# Patient Record
Sex: MALE | Race: BLACK OR AFRICAN AMERICAN | NOT HISPANIC OR LATINO | Employment: UNEMPLOYED | URBAN - METROPOLITAN AREA
[De-identification: names, ages, dates, MRNs, and addresses within clinical notes are randomized per-mention and may not be internally consistent; named-entity substitution may affect disease eponyms.]

---

## 2017-01-02 ENCOUNTER — HOSPITAL ENCOUNTER (EMERGENCY)
Facility: HOSPITAL | Age: 42
Discharge: HOME | End: 2017-01-02
Attending: EMERGENCY MEDICINE
Payer: MEDICAID

## 2017-01-02 VITALS
SYSTOLIC BLOOD PRESSURE: 172 MMHG | HEART RATE: 104 BPM | RESPIRATION RATE: 18 BRPM | HEIGHT: 68 IN | WEIGHT: 160 LBS | DIASTOLIC BLOOD PRESSURE: 99 MMHG | OXYGEN SATURATION: 93 % | BODY MASS INDEX: 24.25 KG/M2

## 2017-01-02 DIAGNOSIS — R06.03 RESPIRATORY DISTRESS: Primary | ICD-10-CM

## 2017-01-02 DIAGNOSIS — J45.901 ACUTE SEVERE EXACERBATION OF ASTHMA: ICD-10-CM

## 2017-01-02 LAB
ALBUMIN SERPL BCP-MCNC: 4 G/DL
ALP SERPL-CCNC: 67 U/L
ALT SERPL W/O P-5'-P-CCNC: 21 U/L
ANION GAP SERPL CALC-SCNC: 11 MMOL/L
AST SERPL-CCNC: 20 U/L
BASOPHILS # BLD AUTO: 0.08 K/UL
BASOPHILS NFR BLD: 0.5 %
BILIRUB SERPL-MCNC: 0.7 MG/DL
BNP SERPL-MCNC: 26 PG/ML
BUN SERPL-MCNC: 8 MG/DL
CALCIUM SERPL-MCNC: 9.5 MG/DL
CHLORIDE SERPL-SCNC: 104 MMOL/L
CO2 SERPL-SCNC: 24 MMOL/L
CREAT SERPL-MCNC: 1 MG/DL
DIFFERENTIAL METHOD: ABNORMAL
EOSINOPHIL # BLD AUTO: 2.3 K/UL
EOSINOPHIL NFR BLD: 15 %
ERYTHROCYTE [DISTWIDTH] IN BLOOD BY AUTOMATED COUNT: 15.2 %
EST. GFR  (AFRICAN AMERICAN): >60 ML/MIN/1.73 M^2
EST. GFR  (NON AFRICAN AMERICAN): >60 ML/MIN/1.73 M^2
GLUCOSE SERPL-MCNC: 116 MG/DL
HCT VFR BLD AUTO: 40.5 %
HGB BLD-MCNC: 13.2 G/DL
LYMPHOCYTES # BLD AUTO: 1 K/UL
LYMPHOCYTES NFR BLD: 6.3 %
MCH RBC QN AUTO: 24.4 PG
MCHC RBC AUTO-ENTMCNC: 32.6 %
MCV RBC AUTO: 75 FL
MONOCYTES # BLD AUTO: 0.2 K/UL
MONOCYTES NFR BLD: 1.6 %
NEUTROPHILS # BLD AUTO: 11.6 K/UL
NEUTROPHILS NFR BLD: 76.9 %
PLATELET # BLD AUTO: 344 K/UL
PMV BLD AUTO: 11.5 FL
POTASSIUM SERPL-SCNC: 4.1 MMOL/L
PROT SERPL-MCNC: 7.3 G/DL
RBC # BLD AUTO: 5.42 M/UL
SODIUM SERPL-SCNC: 139 MMOL/L
WBC # BLD AUTO: 15.17 K/UL

## 2017-01-02 PROCEDURE — 63600175 PHARM REV CODE 636 W HCPCS: Performed by: EMERGENCY MEDICINE

## 2017-01-02 PROCEDURE — 96374 THER/PROPH/DIAG INJ IV PUSH: CPT

## 2017-01-02 PROCEDURE — 80053 COMPREHEN METABOLIC PANEL: CPT

## 2017-01-02 PROCEDURE — 25000242 PHARM REV CODE 250 ALT 637 W/ HCPCS: Performed by: EMERGENCY MEDICINE

## 2017-01-02 PROCEDURE — 94640 AIRWAY INHALATION TREATMENT: CPT

## 2017-01-02 PROCEDURE — 83880 ASSAY OF NATRIURETIC PEPTIDE: CPT

## 2017-01-02 PROCEDURE — 99285 EMERGENCY DEPT VISIT HI MDM: CPT | Mod: 25

## 2017-01-02 PROCEDURE — 85025 COMPLETE CBC W/AUTO DIFF WBC: CPT

## 2017-01-02 RX ORDER — METHYLPREDNISOLONE SODIUM SUCCINATE 125 MG/2ML
125 INJECTION INTRAMUSCULAR; INTRAVENOUS
Status: COMPLETED | OUTPATIENT
Start: 2017-01-02 | End: 2017-01-02

## 2017-01-02 RX ORDER — IPRATROPIUM BROMIDE AND ALBUTEROL SULFATE 2.5; .5 MG/3ML; MG/3ML
3 SOLUTION RESPIRATORY (INHALATION)
Status: COMPLETED | OUTPATIENT
Start: 2017-01-02 | End: 2017-01-02

## 2017-01-02 RX ORDER — PREDNISONE 20 MG/1
60 TABLET ORAL DAILY
Qty: 15 TABLET | Refills: 0 | Status: SHIPPED | OUTPATIENT
Start: 2017-01-02 | End: 2017-01-07

## 2017-01-02 RX ORDER — ALBUTEROL SULFATE 90 UG/1
1-2 AEROSOL, METERED RESPIRATORY (INHALATION) EVERY 6 HOURS PRN
Qty: 1 INHALER | Refills: 1 | Status: SHIPPED | OUTPATIENT
Start: 2017-01-02 | End: 2017-02-06

## 2017-01-02 RX ORDER — ALBUTEROL SULFATE 2.5 MG/.5ML
5 SOLUTION RESPIRATORY (INHALATION)
Status: COMPLETED | OUTPATIENT
Start: 2017-01-02 | End: 2017-01-02

## 2017-01-02 RX ORDER — FLUTICASONE PROPIONATE AND SALMETEROL XINAFOATE 230; 21 UG/1; UG/1
2 AEROSOL, METERED RESPIRATORY (INHALATION) 2 TIMES DAILY
Qty: 12 G | Refills: 0 | Status: SHIPPED | OUTPATIENT
Start: 2017-01-02 | End: 2017-03-31

## 2017-01-02 RX ADMIN — IPRATROPIUM BROMIDE AND ALBUTEROL SULFATE 3 ML: .5; 3 SOLUTION RESPIRATORY (INHALATION) at 02:01

## 2017-01-02 RX ADMIN — ALBUTEROL SULFATE 5 MG: 2.5 SOLUTION RESPIRATORY (INHALATION) at 02:01

## 2017-01-02 RX ADMIN — METHYLPREDNISOLONE SODIUM SUCCINATE 125 MG: 125 INJECTION, POWDER, FOR SOLUTION INTRAMUSCULAR; INTRAVENOUS at 02:01

## 2017-01-02 NOTE — ED AVS SNAPSHOT
[facility]  [place] [address]               Harry Barrientos Jr.   2017  1:23 PM   ED    Description:  Male : 1975   Department:  [facility]           Your Care was Coordinated By:     Provider Role From To    René Jett MD Attending Provider 17 1331 --      Reason for Visit     Shortness of Breath           Diagnoses this Visit        Comments    Respiratory distress    -  Primary     Acute severe exacerbation of asthma           ED Disposition     None           To Do List           Follow-up Information     Follow up with Silvestre Cole MD In 3 days.    Specialty:  Family Medicine    Contact information:    [address]  [facility] [address]  [phone]         These Medications        Disp Refills Start End    fluticasone-salmeterol 230-21 mcg/dose (ADVAIR HFA) 230-21 mcg/actuation HFAA inhaler 12 g 0 2017     Inhale 2 puffs into the lungs 2 (two) times daily. - Inhalation    [facility] [id number] [place], [place] [address]NIT[place] VICENTE [facility] [facility] Ph #: [phone]       albuterol 90 mcg/actuation inhaler 1 Inhaler 1 2017    Inhale 1-2 puffs into the lungs every 6 (six) hours as needed for Wheezing. - Inhalation    [facility] [address] [facility] [place] [facility] [address] [facility]  [place] Ph #: [phone]       predniSONE (DELTASONE) 20 MG tablet 15 tablet 0 2017    Take 3 tablets (60 mg total) by mouth once daily. - Oral    [facility] [address] [facility] [address] [facility]  [place] Ph #: [phone]         [facility]     [facility] Care [facility] -  Assistance  Registered nurses in the [facility] provide clinical advisement, health education, appointment booking, and other advisory  services.  Call for this free service at [phone].             Medications           Message regarding Medications     Verify the changes and/or additions to your medication regime listed below are the same as discussed with your clinician today.  If any of these changes or additions are incorrect, please notify your healthcare provider.        START taking these NEW medications        Refills    fluticasone-salmeterol 230-21 mcg/dose (ADVAIR HFA) 230-21 mcg/actuation HFAA inhaler 0    Sig: Inhale 2 puffs into the lungs 2 (two) times daily.    Class: Print    Route: Inhalation    albuterol 90 mcg/actuation inhaler 1    Sig: Inhale 1-2 puffs into the lungs every 6 (six) hours as needed for Wheezing.    Class: Print    Route: Inhalation    predniSONE (DELTASONE) 20 MG tablet 0    Sig: Take 3 tablets (60 mg total) by mouth once daily.    Class: Print    Route: Oral      These medications were administered today        Dose Freq    methylPREDNISolone sodium succinate injection 125 mg 125 mg ED 1 Time    Sig: Inject 125 mg into the vein ED 1 Time.    Class: Normal    Route: Intravenous    albuterol-ipratropium 2.5mg-0.5mg/3mL nebulizer solution 3 mL 3 mL ED 1 Time    Sig: Take 3 mLs by nebulization ED 1 Time.    Class: Normal    Route: Nebulization    albuterol sulfate nebulizer solution 5 mg 5 mg ED 1 Time    Sig: Take 5 mg by nebulization ED 1 Time.    Class: Normal    Route: Nebulization      STOP taking these medications     fluticasone-salmeterol 250-50 mcg/dose (ADVAIR) 250-50 mcg/dose diskus inhaler Inhale 1 puff into the lungs 2 (two) times daily.           Verify that the below list of medications is an accurate representation of the medications you are currently taking.  If none reported, the list may be blank. If incorrect, please contact your healthcare provider. Carry this list with you in case of emergency.           Current Medications     albuterol 90 mcg/actuation inhaler Inhale 1-2 puffs into the  "lungs every 6 (six) hours as needed for Wheezing.    albuterol sulfate nebulizer solution 5 mg Take 5 mg by nebulization ED 1 Time.    albuterol-ipratropium 2.5mg-0.5mg/3mL (DUO-NEB) 0.5 mg-3 mg(2.5 mg base)/3 mL nebulizer solution Take 3 mLs by nebulization every 6 (six) hours while awake.    albuterol-ipratropium 2.5mg-0.5mg/3mL nebulizer solution 3 mL Take 3 mLs by nebulization ED 1 Time.    dicyclomine (BENTYL) 20 mg tablet Take 1 tablet (20 mg total) by mouth every 6 (six) hours as needed (every 6 hours as needed for pain).    fluticasone-salmeterol 230-21 mcg/dose (ADVAIR HFA) 230-21 mcg/actuation HFAA inhaler Inhale 2 puffs into the lungs 2 (two) times daily.    predniSONE (DELTASONE) 20 MG tablet Take 3 tablets (60 mg total) by mouth once daily.           Clinical Reference Information           Your Vitals Were     BP Pulse Resp Height Weight SpO2    150/107 108 18 5' 8" (1.727 m) 72.6 kg (160 lb) 94%    BMI                24.33 kg/m2          Allergies as of 1/2/2017     No Known Allergies      Immunizations Administered on Date of Encounter - 1/2/2017     None      ED Micro, Lab, POCT     Start Ordered       Status Ordering Provider    01/02/17 1347 01/02/17 1346  Comprehensive metabolic panel  STAT      Final result     01/02/17 1347 01/02/17 1346  CBC auto differential  STAT      Final result     01/02/17 1347 01/02/17 1346  Brain natriuretic peptide  STAT      Final result       ED Imaging Orders     Start Ordered       Status Ordering Provider    01/02/17 1346 01/02/17 1346  X-Ray Chest AP Portable  1 time imaging      Final result         Discharge Instructions       Please return immediately if you get worse or if new problems develop.  Please make an appointment to see your primary care doctor this week.  Rest.     [facility] complies with applicable Federal civil rights laws and does not discriminate on the basis of race, color, national origin, age, disability, or sex.      "   Language Assistance Services     ATTENTION: Language assistance services are available, free of charge. Please call [phone].      ATENCIÓN: Si habla gerardañol, tiene a medina disposición servicios gratuitos de asistencia lingüística. Llame al [phone].     CHÚ Ý: N?u b?n nói Ti?ng Vi?t, có các d?ch v? h? tr? ngôn ng? mi?n phí dành cho b?n. G?i s? [phone].

## 2017-01-02 NOTE — ED PROVIDER NOTES
Encounter Date: 1/2/2017    SCRIBE #1 NOTE: I, Laurie Hui , am scribing for, and in the presence of,  René Jett MD. I have scribed the following portions of the note - Other sections scribed: HPI and ROS .       History     Chief Complaint   Patient presents with    Shortness of Breath     pt states history of asthma and SOB today, pt given breathing treatment by EMS and states minimal relief.      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 Pt states he used a total of 60 albuterol pumps yesterday. Pt had his first breathing treatment 1 hour PTA and his second via EMS en route; pt reports minimal relief. Pt denies hx smoking. Pt denies fever, chills, headache, abdominal pain or other symptoms. No medical problems. No allergies. PSHx includes appendectomy and back surgery.     The history is provided by the patient. No  was used.     Past Medical History   Diagnosis Date    Asthma     Herniated lumbar intervertebral disc      No past medical history pertinent negatives.  Past Surgical History   Procedure Laterality Date    Appendectomy      Back surgery       Family History   Problem Relation Age of Onset    Diabetes Mother     Heart disease Father      CABG     Social History   Substance Use Topics    Smoking status: Never Smoker    Smokeless tobacco: None    Alcohol use No      Comment: 2-3 beers/day; occasionally more on weekends     Review of Systems   Constitutional: Negative for chills and fever.   HENT: Negative for ear pain and sore throat.    Eyes: Negative for pain.   Respiratory: Positive for cough (green sputum ) and shortness of breath.    Cardiovascular: Negative for chest pain.   Gastrointestinal: Negative for abdominal pain,  diarrhea, nausea and vomiting.   Genitourinary: Negative for dysuria.   Musculoskeletal: Negative for myalgias (arm or leg pain).   Skin: Negative for rash.   Neurological: Negative for headaches.       Physical Exam   Initial Vitals   BP Pulse Resp Temp SpO2   01/02/17 1320 01/02/17 1320 01/02/17 1320 -- 01/02/17 1328   178/87 117 22  92 %     Physical Exam  The patient was examined specifically for the following:   General:No significant distress, Good color, Warm and dry. Head and neck:Scalp atraumatic, Neck supple. Neurological:Appropriate conversation, Gross motor deficits. Eyes:Conjugate gaze, Clear corneas. ENT: No epistaxis. Cardiac: Regular rate and rhythm, Grossly normal heart tones. Pulmonary: Wheezing, Rales. Gastrointestinal: Abdominal tenderness, Abdominal distention. Musculoskeletal: Extremity deformity, Apparent pain with range of motion of the joints. Skin: Rash.   The findings on examination were normal except for the following: The patient is in moderate respiratory distress.  He has wheezing bilaterally.  Oxygen saturations are 92%.  The abdomen is nontender.  The heart is remarkable for regular tachycardia.  ED Course   Critical Care  Date/Time: 1/2/2017 4:07 PM  Performed by: MARV NIETO.  Authorized by: MARV NIETO   Direct patient critical care time: 22 minutes  Additional history critical care time: 11 minutes  Ordering / reviewing critical care time: 7 minutes  Documentation critical care time: 15 minutes  Total critical care time (exclusive of procedural time) : 55 minutes  Critical care time was exclusive of separately billable procedures and treating other patients and teaching time.  Critical care was necessary to treat or prevent imminent or life-threatening deterioration of the following conditions: respiratory failure.  Critical care was time spent personally by me on the following activities: development of treatment plan with patient or surrogate, examination of patient,  ordering and review of laboratory studies, re-evaluation of patient's condition, pulse oximetry, ordering and performing treatments and interventions, evaluation of patient's response to treatment, discussions with primary provider, review of old charts, ordering and review of radiographic studies and obtaining history from patient or surrogate.        Labs Reviewed   COMPREHENSIVE METABOLIC PANEL - Abnormal; Notable for the following:        Result Value    Glucose 116 (*)     All other components within normal limits   CBC W/ AUTO DIFFERENTIAL - Abnormal; Notable for the following:     WBC 15.17 (*)     Hemoglobin 13.2 (*)     MCV 75 (*)     MCH 24.4 (*)     RDW 15.2 (*)     Gran # 11.6 (*)     Mono # 0.2 (*)     Eos # 2.3 (*)     Gran% 76.9 (*)     Lymph% 6.3 (*)     Mono% 1.6 (*)     Eosinophil% 15.0 (*)     All other components within normal limits   B-TYPE NATRIURETIC PEPTIDE     EKG Readings: (Independently Interpreted)   The patient's EKG reveals a normal sinus rhythm with a marked sinus arrhythmia.  There are no significant ST segment or T-wave changes.  There is no evidence of acute myocardial infarction or malignant arrhythmia.        X-Rays:   Independently Interpreted Readings:   Other Readings:  Chest x-ray fails to reveal pneumothorax pneumonia pleural effusion        Medical decision making: Given the above, this patient presents to emergency room with shortness of breath.  The patient was in severe respiratory distress he arrived by ambulance.  He had wheezing throughout.  He was treated with advise her treatments and IV steroids at 410 the patient is comfortable.  I will discharge him to outpatient evaluation and treatment.  I will start him back on steroids.  I will continue his albuterol.  I will have him return if he gets worse or if new problems develop.  I doubt pulmonary embolus pneumothorax pneumonia pleural effusion the chest x-ray is negative.  I do not think this is a cardiac syndrome            Scribe Attestation:   Scribe #1: I performed the above scribed service and the documentation accurately describes the services I performed. I attest to the accuracy of the note.    Attending Attestation:           Physician Attestation for Scribe:  Physician Attestation Statement for Scribe #1: I, René Jett MD, reviewed documentation, as scribed by Laurie Hui  in my presence, and it is both accurate and complete.                 ED Course     Clinical Impression:   The primary encounter diagnosis was Respiratory distress. A diagnosis of Acute severe exacerbation of asthma was also pertinent to this visit.          René Jett MD  01/03/17 0849

## 2017-01-02 NOTE — ED NOTES
Hourly rounding performed.  Pt resting in bed, VS stable, cardiac, pulse ox and BP monitoring in place. Side rails up, call light within reach, pt resting.

## 2017-01-02 NOTE — DISCHARGE INSTRUCTIONS
Please return immediately if you get worse or if new problems develop.  Please make an appointment to see your primary care doctor this week.  Rest.

## 2017-01-02 NOTE — ED TRIAGE NOTES
Pt here w SOB since last night. History of asthma. States that usually breathing treatment helps but today it hasn't. Productive cough with yellow tinged sputum.

## 2017-01-02 NOTE — ED NOTES
Hourly rounding performed.  Pt resting in bed, VS stable, cardiac, pulse ox and BP monitoring in place. Pt reports that he is feeling much better and is breathing fine.  Pt sounds lightly congested when he speaks but no longer appears short of breath.

## 2017-02-06 ENCOUNTER — HOSPITAL ENCOUNTER (EMERGENCY)
Facility: HOSPITAL | Age: 42
Discharge: HOME | End: 2017-02-07
Attending: EMERGENCY MEDICINE
Payer: MEDICAID

## 2017-02-06 DIAGNOSIS — J45.51 SEVERE PERSISTENT ASTHMA WITH EXACERBATION: Primary | ICD-10-CM

## 2017-02-06 LAB
ALBUMIN SERPL BCP-MCNC: 4 G/DL
ALP SERPL-CCNC: 63 U/L
ALT SERPL W/O P-5'-P-CCNC: 16 U/L
ANION GAP SERPL CALC-SCNC: 10 MMOL/L
AST SERPL-CCNC: 17 U/L
BASOPHILS # BLD AUTO: 0.01 K/UL
BASOPHILS NFR BLD: 0.1 %
BILIRUB SERPL-MCNC: 0.3 MG/DL
BUN SERPL-MCNC: 12 MG/DL
CALCIUM SERPL-MCNC: 9.2 MG/DL
CHLORIDE SERPL-SCNC: 103 MMOL/L
CO2 SERPL-SCNC: 25 MMOL/L
CREAT SERPL-MCNC: 1.1 MG/DL
DIFFERENTIAL METHOD: ABNORMAL
EOSINOPHIL # BLD AUTO: 0 K/UL
EOSINOPHIL NFR BLD: 0 %
ERYTHROCYTE [DISTWIDTH] IN BLOOD BY AUTOMATED COUNT: 15.6 %
EST. GFR  (AFRICAN AMERICAN): >60 ML/MIN/1.73 M^2
EST. GFR  (NON AFRICAN AMERICAN): >60 ML/MIN/1.73 M^2
GLUCOSE SERPL-MCNC: 105 MG/DL
HCT VFR BLD AUTO: 40.5 %
HGB BLD-MCNC: 13.2 G/DL
INR PPP: 1
LYMPHOCYTES # BLD AUTO: 0.4 K/UL
LYMPHOCYTES NFR BLD: 2.7 %
MAGNESIUM SERPL-MCNC: 2.3 MG/DL
MCH RBC QN AUTO: 24.2 PG
MCHC RBC AUTO-ENTMCNC: 32.6 %
MCV RBC AUTO: 74 FL
MONOCYTES # BLD AUTO: 1.5 K/UL
MONOCYTES NFR BLD: 10.3 %
NEUTROPHILS # BLD AUTO: 12.4 K/UL
NEUTROPHILS NFR BLD: 86.9 %
PLATELET # BLD AUTO: 298 K/UL
PMV BLD AUTO: 11.2 FL
POTASSIUM SERPL-SCNC: 4 MMOL/L
PROT SERPL-MCNC: 7.6 G/DL
PROTHROMBIN TIME: 10.7 SEC
RBC # BLD AUTO: 5.45 M/UL
SODIUM SERPL-SCNC: 138 MMOL/L
WBC # BLD AUTO: 14.22 K/UL

## 2017-02-06 PROCEDURE — 99284 EMERGENCY DEPT VISIT MOD MDM: CPT | Mod: 25

## 2017-02-06 PROCEDURE — 85025 COMPLETE CBC W/AUTO DIFF WBC: CPT

## 2017-02-06 PROCEDURE — 83735 ASSAY OF MAGNESIUM: CPT

## 2017-02-06 PROCEDURE — 80053 COMPREHEN METABOLIC PANEL: CPT

## 2017-02-06 PROCEDURE — 96374 THER/PROPH/DIAG INJ IV PUSH: CPT

## 2017-02-06 PROCEDURE — 84484 ASSAY OF TROPONIN QUANT: CPT

## 2017-02-06 PROCEDURE — 25000242 PHARM REV CODE 250 ALT 637 W/ HCPCS: Performed by: EMERGENCY MEDICINE

## 2017-02-06 PROCEDURE — 27100107 HC POCKET PEAK FLOW METER

## 2017-02-06 PROCEDURE — 99900035 HC TECH TIME PER 15 MIN (STAT)

## 2017-02-06 PROCEDURE — 63600175 PHARM REV CODE 636 W HCPCS: Performed by: EMERGENCY MEDICINE

## 2017-02-06 PROCEDURE — 83880 ASSAY OF NATRIURETIC PEPTIDE: CPT

## 2017-02-06 PROCEDURE — 94644 CONT INHLJ TX 1ST HOUR: CPT

## 2017-02-06 PROCEDURE — 85610 PROTHROMBIN TIME: CPT

## 2017-02-06 RX ORDER — PREDNISONE 5 MG/1
5 TABLET ORAL DAILY
COMMUNITY
End: 2017-03-31

## 2017-02-06 RX ORDER — METHYLPREDNISOLONE SODIUM SUCCINATE 125 MG/2ML
125 INJECTION INTRAMUSCULAR; INTRAVENOUS
Status: COMPLETED | OUTPATIENT
Start: 2017-02-06 | End: 2017-02-06

## 2017-02-06 RX ORDER — IPRATROPIUM BROMIDE 0.5 MG/2.5ML
0.5 SOLUTION RESPIRATORY (INHALATION) CONTINUOUS
Status: DISCONTINUED | OUTPATIENT
Start: 2017-02-06 | End: 2017-02-07 | Stop reason: HOSPADM

## 2017-02-06 RX ORDER — ALBUTEROL SULFATE 2.5 MG/.5ML
10 SOLUTION RESPIRATORY (INHALATION) CONTINUOUS
Status: DISCONTINUED | OUTPATIENT
Start: 2017-02-06 | End: 2017-02-07 | Stop reason: HOSPADM

## 2017-02-06 RX ADMIN — IPRATROPIUM BROMIDE 0.5 MG: 0.5 SOLUTION RESPIRATORY (INHALATION) at 11:02

## 2017-02-06 RX ADMIN — ALBUTEROL SULFATE 10 MG: 2.5 SOLUTION RESPIRATORY (INHALATION) at 11:02

## 2017-02-06 RX ADMIN — METHYLPREDNISOLONE SODIUM SUCCINATE 125 MG: 125 INJECTION, POWDER, FOR SOLUTION INTRAMUSCULAR; INTRAVENOUS at 11:02

## 2017-02-06 NOTE — ED AVS SNAPSHOT
[facility]  [address] [place] [address]               Harry Barrientos Jr.   2017 11:09 PM   ED    Description:  Male : 1975   Department:  [facility]           Your Care was Coordinated By:     Provider Role From To    Antonio Pratt MD Attending Provider 17 5211 --      Reason for Visit     Shortness of Breath           Diagnoses this Visit        Comments    Severe persistent asthma with exacerbation    -  Primary       ED Disposition     ED Disposition Condition Comment    Discharge             To Do List           Follow-up Information     Follow up with Haven Herrera MD. Schedule an appointment as soon as possible for a visit in 2 days.    Specialty:  Family Medicine    Contact information:    [address] [address]  [phone]         These Medications        Disp Refills Start End    predniSONE (DELTASONE) 10 MG tablet 21 tablet 0 2017    Take 1 tablet (10 mg total) by mouth once daily. Take 4 tabs x 3 days, then  Take 2 tabs x 3 days, then   Take 1 tab x 3 days. - Oral    Pharmacy: [facility] [address] [place] [address] [address] AT [facility] Ph #: [phone]       albuterol 90 mcg/actuation inhaler 1 Inhaler 0 2017    Inhale 1-2 puffs into the lungs every 6 (six) hours as needed for Wheezing. - Inhalation    Pharmacy: [facility] [address] [place] [address] [address] AT [facility] Ph #: [phone]         [facility]     [facility] Care Line -  Assistance  Registered nurses in the [facility] provide clinical advisement, health education, appointment booking, and other advisory services.  Call for this free service at [phone].             Medications           Message regarding Medications     Verify the changes and/or additions to your medication  regime listed below are the same as discussed with your clinician today.  If any of these changes or additions are incorrect, please notify your healthcare provider.        START taking these NEW medications        Refills    predniSONE (DELTASONE) 10 MG tablet 0    Sig: Take 1 tablet (10 mg total) by mouth once daily. Take 4 tabs x 3 days, then  Take 2 tabs x 3 days, then   Take 1 tab x 3 days.    Class: Print    Route: Oral    albuterol 90 mcg/actuation inhaler 0    Sig: Inhale 1-2 puffs into the lungs every 6 (six) hours as needed for Wheezing.    Class: Print    Route: Inhalation      These medications were administered today        Dose Freq    methylPREDNISolone sodium succinate injection 125 mg 125 mg ED 1 Time    Sig: Inject 125 mg into the vein ED 1 Time.    Class: Normal    Route: Intravenous    albuterol sulfate nebulizer solution 10 mg 10 mg Continuous    Sig: Take 10 mg by nebulization continuous.    Class: Normal    Route: Nebulization    ipratropium 0.02 % nebulizer solution 0.5 mg 0.5 mg Continuous    Sig: Take 2.5 mLs (0.5 mg total) by nebulization continuous.    Class: Normal    Route: Nebulization           Verify that the below list of medications is an accurate representation of the medications you are currently taking.  If none reported, the list may be blank. If incorrect, please contact your healthcare provider. Carry this list with you in case of emergency.           Current Medications     albuterol 90 mcg/actuation inhaler Inhale 1-2 puffs into the lungs every 6 (six) hours as needed for Wheezing.    albuterol-ipratropium 2.5mg-0.5mg/3mL (DUO-NEB) 0.5 mg-3 mg(2.5 mg base)/3 mL nebulizer solution Take 3 mLs by nebulization every 6 (six) hours while awake.    predniSONE (DELTASONE) 5 MG tablet Take 5 mg by mouth once daily.    albuterol 90 mcg/actuation inhaler Inhale 1-2 puffs into the lungs every 6 (six) hours as needed for Wheezing.    albuterol sulfate nebulizer solution 10 mg Take 10 mg  "by nebulization continuous.    dicyclomine (BENTYL) 20 mg tablet Take 1 tablet (20 mg total) by mouth every 6 (six) hours as needed (every 6 hours as needed for pain).    fluticasone-salmeterol 230-21 mcg/dose (ADVAIR HFA) 230-21 mcg/actuation HFAA inhaler Inhale 2 puffs into the lungs 2 (two) times daily.    ipratropium 0.02 % nebulizer solution 0.5 mg Take 2.5 mLs (0.5 mg total) by nebulization continuous.    predniSONE (DELTASONE) 10 MG tablet Take 1 tablet (10 mg total) by mouth once daily. Take 4 tabs x 3 days, then  Take 2 tabs x 3 days, then   Take 1 tab x 3 days.           Clinical Reference Information           Your Vitals Were     BP Pulse Temp Resp Height Weight    132/86 110 98.8 °F (37.1 °C) (Oral) 22 5' 8" (1.727 m) 77.1 kg (170 lb)    SpO2 BMI             92% 25.85 kg/m2         Allergies as of 2/7/2017     No Known Allergies      Immunizations Administered on Date of Encounter - 2/7/2017     None      ED Micro, Lab, POCT     Start Ordered       Status Ordering Provider    02/06/17 2314 02/06/17 2313  Drug screen panel, emergency  STAT      Acknowledged     02/06/17 2312 02/06/17 2311  Troponin I  STAT      Final result     02/06/17 2312 02/06/17 2311  Comprehensive metabolic panel  STAT      Final result     02/06/17 2312 02/06/17 2311  CBC auto differential  STAT      Final result     02/06/17 2312 02/06/17 2311  Magnesium  STAT      Final result     02/06/17 2312 02/06/17 2311  Brain natriuretic peptide  STAT      Final result     02/06/17 2312 02/06/17 2311  Protime-INR  STAT      Final result       ED Imaging Orders     Start Ordered       Status Ordering Provider    02/06/17 2312 02/06/17 2311  X-Ray Chest AP Portable  1 time imaging      Final result        [facility] complies with applicable Federal civil rights laws and does not discriminate on the basis of race, color, national origin, age, disability, or sex.        Language Assistance Services     ATTENTION: Language " assistance services are available, free of charge. Please call [phone].      ATENCIÓN: Si chris crump, tiene a medina disposición servicios gratuitos de asistencia lingüística. Llame al [phone].     CHÚ Ý: N?u b?n nói Ti?ng Vi?t, có các d?ch v? h? tr? ngôn ng? mi?n phí dành cho b?n. G?i s? [phone].        Medications Administered     albuterol sulfate nebulizer solution 10 mg                  ipratropium 0.02 % nebulizer solution 0.5 mg                  methylPREDNISolone sodium succinate injection 125 mg                    Administrations This Visit        Admin Date Action                   albuterol sulfate nebulizer solution 10 mg 02/06/2017 New Bag                   Admin Date Action                   ipratropium 0.02 % nebulizer solution 0.5 mg 02/06/2017 New Bag                   Admin Date Action                   methylPREDNISolone sodium succinate injection 125 mg 02/06/2017 Given                  Administrations This Visit     albuterol sulfate nebulizer solution 10 mg     Admin Date Action Dose Route Administered By             02/06/2017 New Bag 10 mg Nebulization Vicki Mckeon CRT                    ipratropium 0.02 % nebulizer solution 0.5 mg     Admin Date Action Dose Route Administered By             02/06/2017 New Bag 0.5 mg Nebulization Vicki Mckeon CRT                    methylPREDNISolone sodium succinate injection 125 mg     Admin Date Action Dose Route Administered By             02/06/2017 Given 125 mg Intravenous Holly Lambert RN

## 2017-02-07 VITALS
DIASTOLIC BLOOD PRESSURE: 86 MMHG | WEIGHT: 170 LBS | HEART RATE: 110 BPM | BODY MASS INDEX: 25.76 KG/M2 | OXYGEN SATURATION: 92 % | HEIGHT: 68 IN | SYSTOLIC BLOOD PRESSURE: 132 MMHG | RESPIRATION RATE: 22 BRPM | TEMPERATURE: 99 F

## 2017-02-07 LAB
BNP SERPL-MCNC: 29 PG/ML
TROPONIN I SERPL DL<=0.01 NG/ML-MCNC: 0.01 NG/ML

## 2017-02-07 RX ORDER — PREDNISONE 10 MG/1
10 TABLET ORAL DAILY
Qty: 21 TABLET | Refills: 0 | Status: SHIPPED | OUTPATIENT
Start: 2017-02-07 | End: 2017-02-17

## 2017-02-07 RX ORDER — ALBUTEROL SULFATE 90 UG/1
1-2 AEROSOL, METERED RESPIRATORY (INHALATION) EVERY 6 HOURS PRN
Qty: 1 INHALER | Refills: 0 | Status: SHIPPED | OUTPATIENT
Start: 2017-02-07 | End: 2017-03-31

## 2017-02-07 NOTE — ED TRIAGE NOTES
Pt with medical history of asthma presents to ED with c/o SOB that began on yesterday after using heroine. Pt also reports mid-sternal chest tightness. Denies dizziness or vomiting.

## 2017-03-31 ENCOUNTER — HOSPITAL ENCOUNTER (EMERGENCY)
Facility: HOSPITAL | Age: 42
Discharge: HOME | End: 2017-03-31
Attending: EMERGENCY MEDICINE
Payer: MEDICAID

## 2017-03-31 VITALS
SYSTOLIC BLOOD PRESSURE: 139 MMHG | HEART RATE: 84 BPM | BODY MASS INDEX: 24.25 KG/M2 | WEIGHT: 160 LBS | RESPIRATION RATE: 20 BRPM | HEIGHT: 68 IN | DIASTOLIC BLOOD PRESSURE: 79 MMHG | OXYGEN SATURATION: 95 % | TEMPERATURE: 98 F

## 2017-03-31 DIAGNOSIS — M54.50 ACUTE LEFT-SIDED LOW BACK PAIN WITHOUT SCIATICA: Primary | ICD-10-CM
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
## 2017-03-31 PROCEDURE — 25000003 PHARM REV CODE 250: Performed by: EMERGENCY MEDICINE

## 2017-03-31 PROCEDURE — 99283 EMERGENCY DEPT VISIT LOW MDM: CPT | Mod: 25

## 2017-03-31 PROCEDURE — 63600175 PHARM REV CODE 636 W HCPCS: Performed by: EMERGENCY MEDICINE

## 2017-03-31 PROCEDURE — 96372 THER/PROPH/DIAG INJ SC/IM: CPT

## 2017-03-31 RX ORDER — HYDROCODONE BITARTRATE AND ACETAMINOPHEN 5; 325 MG/1; MG/1
2 TABLET ORAL
Status: COMPLETED | OUTPATIENT
Start: 2017-03-31 | End: 2017-03-31

## 2017-03-31 RX ORDER — KETOROLAC TROMETHAMINE 30 MG/ML
60 INJECTION, SOLUTION INTRAMUSCULAR; INTRAVENOUS
Status: COMPLETED | OUTPATIENT
Start: 2017-03-31 | End: 2017-03-31

## 2017-03-31 RX ORDER — HYDROCODONE BITARTRATE AND ACETAMINOPHEN 5; 325 MG/1; MG/1
2 TABLET ORAL EVERY 6 HOURS PRN
Qty: 20 TABLET | Refills: 0 | Status: SHIPPED | OUTPATIENT
Start: 2017-03-31 | End: 2017-08-27

## 2017-03-31 RX ORDER — ALBUTEROL SULFATE 90 UG/1
2 AEROSOL, METERED RESPIRATORY (INHALATION) EVERY 4 HOURS PRN
Qty: 1 INHALER | Refills: 2 | Status: SHIPPED | OUTPATIENT
Start: 2017-03-31 | End: 2017-04-25

## 2017-03-31 RX ADMIN — KETOROLAC TROMETHAMINE 60 MG: 30 INJECTION, SOLUTION INTRAMUSCULAR at 05:03

## 2017-03-31 RX ADMIN — HYDROCODONE BITARTRATE AND ACETAMINOPHEN 2 TABLET: 5; 325 TABLET ORAL at 05:03

## 2017-03-31 NOTE — ED AVS SNAPSHOT
[facility]  [address] [place] [address]               Harry Barrientos JrVince   3/31/2017  2:14 AM   ED    Description:  Male : 1975   Department:  [facility]           Your Care was Coordinated By:     Provider Role From To    Cherry Mancera MD Attending Provider 17 0415 --      Reason for Visit     Fall           Diagnoses this Visit        Comments    Acute left-sided low back pain without sciatica    -  Primary     Fall           ED Disposition     ED Disposition Condition Comment    Discharge             To Do List           Follow-up Information     Follow up with [facility]. Schedule an appointment as soon as possible for a visit in 3 days.    Specialty:  Orthopedics    Contact information:    [address] [facility] [address] [phone]    Additional information:    [facility] [address]       These Medications        Disp Refills Start End    hydrocodone-acetaminophen 5-325mg (NORCO) 5-325 mg per tablet 20 tablet 0 3/31/2017     Take 2 tablets by mouth every 6 (six) hours as needed for Pain. - Oral    Pharmacy: [facility] [id number] [place] [address] [address] [facility] Ph #: [phone]       albuterol 90 mcg/actuation inhaler 1 Inhaler 2 3/31/2017 3/31/2018    Inhale 2 puffs into the lungs every 4 (four) hours as needed for Wheezing. - Inhalation    Pharmacy: [facility] [id number] [place] [address] [address] [facility] Ph #: [phone]         [facility]     [facility] Care [facility] - 24/ Assistance  Unless otherwise directed by your provider, please contact [facility], our nurse care line that is available for / assistance.     Registered nurses in the [facility] provide: appointment scheduling, clinical advisement, health education, and other advisory services.  Call:  [phone] (toll free)               Medications           Message regarding Medications     Verify the changes and/or additions to your medication regime listed below are the same as discussed with your clinician today.  If any of these changes or additions are incorrect, please notify your healthcare provider.        START taking these NEW medications        Refills    hydrocodone-acetaminophen 5-325mg (NORCO) 5-325 mg per tablet 0    Sig: Take 2 tablets by mouth every 6 (six) hours as needed for Pain.    Class: Print    Route: Oral      These medications were administered today        Dose Freq    hydrocodone-acetaminophen 5-325mg per tablet 2 tablet 2 tablet ED 1 Time    Sig: Take 2 tablets by mouth ED 1 Time.    Class: Normal    Route: Oral    ketorolac injection 60 mg 60 mg ED 1 Time    Sig: Inject 60 mg into the muscle ED 1 Time.    Class: Normal    Route: Intramuscular      CHANGE how you are taking these medications     Start Taking Instead of    albuterol 90 mcg/actuation inhaler albuterol 90 mcg/actuation inhaler    Dosage:  Inhale 2 puffs into the lungs every 4 (four) hours as needed for Wheezing. Dosage:  Inhale 1-2 puffs into the lungs every 6 (six) hours as needed for Wheezing.      STOP taking these medications     dicyclomine (BENTYL) 20 mg tablet Take 1 tablet (20 mg total) by mouth every 6 (six) hours as needed (every 6 hours as needed for pain).    fluticasone-salmeterol 230-21 mcg/dose (ADVAIR HFA) 230-21 mcg/actuation HFAA inhaler Inhale 2 puffs into the lungs 2 (two) times daily.    predniSONE (DELTASONE) 5 MG tablet Take 5 mg by mouth once daily.           Verify that the below list of medications is an accurate representation of the medications you are currently taking.  If none reported, the list may be blank. If incorrect, please contact your healthcare provider. Carry this list with you in case of emergency.           Current Medications     albuterol 90 mcg/actuation inhaler Inhale 2  "puffs into the lungs every 4 (four) hours as needed for Wheezing.    albuterol-ipratropium 2.5mg-0.5mg/3mL (DUO-NEB) 0.5 mg-3 mg(2.5 mg base)/3 mL nebulizer solution Take 3 mLs by nebulization every 6 (six) hours while awake.    hydrocodone-acetaminophen 5-325mg (NORCO) 5-325 mg per tablet Take 2 tablets by mouth every 6 (six) hours as needed for Pain.           Clinical Reference Information           Your Vitals Were     BP Pulse Resp Height Weight SpO2    139/79 84 20 5' 8" (1.727 m) 72.6 kg (160 lb) 95%    BMI                24.33 kg/m2          Allergies as of 3/31/2017     No Known Allergies      Immunizations Administered on Date of Encounter - 3/31/2017     None      ED Micro, Lab, POCT     None      ED Imaging Orders     Start Ordered       Status Ordering Provider    03/31/17 0207 03/31/17 0201  X-Ray Lumbar Spine Ap And Lateral  1 time imaging      Final result     03/31/17 0202 03/31/17 0201  X-Ray Foot Complete Bilateral  1 time imaging      Final result     03/31/17 0202 03/31/17 0201    1 time imaging,   Status:  Canceled      Canceled         Discharge Instructions         Back Pain (Acute or Chronic)    Back pain is one of the most common problems. The good news is that most people feel better in 1 to 2 weeks, and most of the rest in 1 to 2 months. Most people can remain active.  People experience and describe pain differently; not everyone is the same.  · The pain can be sharp, stabbing, shooting, aching, cramping or burning.  · Movement, standing, bending, lifting, sitting, or walking may worsen pain.  · It can be localized to one spot or area, or it can be more generalized.  · It can spread or radiate upwards, to the front, or go down your arms or legs (sciatica).  · It can cause muscle spasm.  Most of the time, mechanical problems with the muscles or spine cause the pain. Mechanical problems are usually caused by an injury to the muscles or ligaments. While illness can cause back pain, it is " usually not caused by a serious illness. Mechanical problems include:   · Physical activity such as sports, exercise, work, or normal activity  · Overexertion, lifting, pushing, pulling incorrectly or too aggressively  · Sudden twisting, bending, or stretching from an accident, or accidental movement  · Poor posture  · Stretching or moving wrong, without noticing pain at the time  · Poor coordination, lack of regular exercise (check with your doctor about this)  · Spinal disc disease or arthritis  · Stress  Pain can also be related to pregnancy, or illness like appendicitis, bladder or kidney infections, pelvic infections, and many other things.  Acute back pain usually gets better in 1 to 2 weeks. Back pain related to disk disease, arthritis in the spinal joints or spinal stenosis (narrowing of the spinal canal) can become chronic and last for months or years.  Unless you had a physical injury (for example, a car accident or fall) X-rays are usually not needed for the initial evaluation of back pain. If pain continues and does not respond to medical treatment, X-rays and other tests may be needed.  Home care  Try these home care recommendations:  · When in bed, try to find a position of comfort. A firm mattress is best. Try lying flat on your back with pillows under your knees. You can also try lying on your side with your knees bent up towards your chest and a pillow between your knees.  · At first, do not try to stretch out the sore spots. If there is a strain, it is not like the good soreness you get after exercising without an injury. In this case, stretching may make it worse.  · Avoid prolong sitting, long car rides, or travel. This puts more stress on the lower back than standing or walking.  · During the first 24 to 72 hours after an acute injury or flare up of chronic back pain, apply an ice pack to the painful area for 20 minutes and then remove it for 20 minutes. Do this over a period of 60 to 90 minutes  or several times a day. This will reduce swelling and pain. Wrap the ice pack in a thin towel or plastic to protect your skin.  · You can start with ice, then switch to heat. Heat (hot shower, hot bath, or heating pad) reduces pain and works well for muscle spasms. Heat can be applied to the painful area for 20 minutes then remove it for 20 minutes. Do this over a period of 60 to 90 minutes or several times a day. Do not sleep on a heating pad. It can lead to skin burns or tissue damage.  · You can alternate ice and heat therapy. Talk with your doctor about the best treatment for your back pain.  · Therapeutic massage can help relax the back muscles without stretching them.  · Be aware of safe lifting methods and do not lift anything without stretching first.  Medicines  Talk to your doctor before using medicine, especially if you have other medical problems or are taking other medicines.  · You may use over-the-counter medicine as directed on the bottle to control pain, unless another pain medicine was prescribed. If you have chronic conditions like diabetes, liver or kidney disease, stomach ulcers, or gastrointestinal bleeding, or are taking blood thinners, talk to your doctor before taking any medicine.  · Be careful if you are given a prescription medicines, narcotics, or medicine for muscle spasms. They can cause drowsiness, affect your coordination, reflexes, and judgement. Do not drive or operate heavy machinery.  Follow-up care  Follow up with your healthcare provider, or as advised.   A radiologist will review any X-rays that were taken. Your provide will notify you of any new findings that may affect your care.  Call 911  Call emergency services if any of the following occur:  · Trouble breathing  · Confusion  · Very drowsy or trouble awakening  · Fainting or loss of consciousness  · Rapid or very slow heart rate  · Loss of bowel or bladder control  When to seek medical advice  Call your healthcare  provider right away if any of these occur:   · Pain becomes worse or spreads to your legs  · Weakness or numbness in one or both legs  · Numbness in the groin or genital area  Date Last Reviewed: 7/1/2016 © 2000-2016 [employer]. [address] [place] [address], [place] [address]. All rights reserved. This information is not intended as a substitute for professional medical care. Always follow your healthcare professional's instructions.           [facility] complies with applicable Federal civil rights laws and does not discriminate on the basis of race, color, national origin, age, disability, or sex.        Language Assistance Services     ATTENTION: Language assistance services are available, free of charge. Please call [phone].      ATENCIÓN: Si chris crump, tiene a medina disposición servicios gratuitos de asistencia lingüística. Llame al [phone].     NEGAR Ý: N?u b?n nói Ti?ng Vi?t, có các d?ch v? h? tr? ngôn ng? mi?n phí dành cho b?n. G?i s? [phone].

## 2017-03-31 NOTE — ED PROVIDER NOTES
"Encounter Date: 3/31/2017    SCRIBE #1 NOTE: I, Gricel Rios, am scribing for, and in the presence of,  Cherry Mancera MD. I have scribed the following portions of the note - Other sections scribed: HPI, ROS.       History     Chief Complaint   Patient presents with    Fall     "earlier today I fell about 4-5ft through a house floor " Pt c/o mid to lower back     Review of patient's allergies indicates:  No Known Allergies  HPI Comments: CC: Fall    HPI: 41 year old male with a PMHx of herniated lumbar intervertebral disc, asthma, and back surgery presents to the ED s/p fall yesterday. Patient reports he fell 5 feet through "rotting" floor, and landed on his left buttock. Patient notes he had a small bowel movement on himself after falling. Patient is c/o severe (10/10) lower back pain saying his "bones are rubbing on each other". Patient reports treating with Ibuprofen which "is a joke". Patient otherwise denies difficulty urinating or ambulating or other symptoms. He has urinated normally since the fall. He denies numbness or weakness to his LEs or genitalia.    Patient notes recent congestion and that his asthma has been acting up.     The history is provided by the patient. No  was used.     Past Medical History:   Diagnosis Date    Asthma     Herniated lumbar intervertebral disc      Past Surgical History:   Procedure Laterality Date    APPENDECTOMY      BACK SURGERY       Family History   Problem Relation Age of Onset    Diabetes Mother     Heart disease Father      CABG     Social History   Substance Use Topics    Smoking status: Never Smoker    Smokeless tobacco: None    Alcohol use 0.0 oz/week     0 Standard drinks or equivalent per week      Comment: 2-3 beers/day; occasionally more on weekends     Review of Systems   Constitutional: Negative for chills and fever.   HENT: Positive for congestion. Negative for rhinorrhea.    Eyes: Negative for pain and visual " disturbance.   Respiratory: Negative for cough and shortness of breath.    Cardiovascular: Negative for chest pain.   Gastrointestinal: Negative for abdominal pain, diarrhea, nausea and vomiting.   Genitourinary: Negative for difficulty urinating.   Musculoskeletal: Positive for back pain (lower). Negative for neck pain.   Skin: Negative for rash.   Neurological: Negative for light-headedness and headaches.       Physical Exam   Initial Vitals   BP Pulse Resp Temp SpO2   03/31/17 0155 03/31/17 0155 03/31/17 0155 -- 03/31/17 0155   135/76 102 20  91 %     Physical Exam    Nursing note and vitals reviewed.  Constitutional: He appears well-developed and well-nourished. He is not diaphoretic. No distress.   Awake and alert. Lying on stretcher moaning but later observed ambulatory without a limp.    HENT:   Head: Normocephalic and atraumatic.   Mouth/Throat: Oropharynx is clear and moist.   Eyes: Conjunctivae and EOM are normal. Pupils are equal, round, and reactive to light.   Neck: Normal range of motion. Neck supple.   Cardiovascular: Normal rate, regular rhythm and normal heart sounds.   Pulmonary/Chest: Effort normal and breath sounds normal. No respiratory distress. He has no wheezes. He has no rhonchi. He has no rales.   Abdominal: Soft. Normal appearance and bowel sounds are normal. There is no tenderness.   Musculoskeletal: Normal range of motion. He exhibits tenderness.        Lumbar back: He exhibits tenderness (diffuse (to palpation)).   Tenderness to palpation of left paraspinal lumbar region. No midline spinal stepoffs.    Neurological: He is alert and oriented to person, place, and time. He has normal strength. No sensory deficit.   Skin: Skin is warm and dry.   Psychiatric: He has a normal mood and affect.         ED Course   Procedures  Labs Reviewed - No data to display          Medical Decision Making:   ED Management:  This patient has been seen in the ED previously for symptoms related to his chronic  back pain. He has no weakness to his LEs or bladder/lower symptoms to suggest spinal cord pathology. He is ambulating well. I reviewed the [place]  website. No recent narcotic rx's noted. He has a ride home. He had toradol IM and vicodin PO in the ED and was rx'ed the same. I have encouraged him to seek f/u to a spine doctor for further workup and treatment of his chronic but now exacerbated back pain.             Scribe Attestation:   Scribe #1: I performed the above scribed service and the documentation accurately describes the services I performed. I attest to the accuracy of the note.    Attending Attestation:           Physician Attestation for Scribe:  Physician Attestation Statement for Scribe #1: I, Cherry Mancera MD, reviewed documentation, as scribed by Gricel Rios in my presence, and it is both accurate and complete.                 ED Course     Clinical Impression:   The primary encounter diagnosis was Acute left-sided low back pain without sciatica. A diagnosis of Fall was also pertinent to this visit.          Cherry Mancera MD  04/02/17 [id number]

## 2017-03-31 NOTE — ED TRIAGE NOTES
Pt states he was visiting someone today that was getting their floors fix and fell through the floor . Pt states he fell 4-5ft injuring his mid and lower back. Pt also c/o wheezing that started 1 week ago and worsened today

## 2017-03-31 NOTE — DISCHARGE INSTRUCTIONS
Back Pain (Acute or Chronic)    Back pain is one of the most common problems. The good news is that most people feel better in 1 to 2 weeks, and most of the rest in 1 to 2 months. Most people can remain active.  People experience and describe pain differently; not everyone is the same.  · The pain can be sharp, stabbing, shooting, aching, cramping or burning.  · Movement, standing, bending, lifting, sitting, or walking may worsen pain.  · It can be localized to one spot or area, or it can be more generalized.  · It can spread or radiate upwards, to the front, or go down your arms or legs (sciatica).  · It can cause muscle spasm.  Most of the time, mechanical problems with the muscles or spine cause the pain. Mechanical problems are usually caused by an injury to the muscles or ligaments. While illness can cause back pain, it is usually not caused by a serious illness. Mechanical problems include:   · Physical activity such as sports, exercise, work, or normal activity  · Overexertion, lifting, pushing, pulling incorrectly or too aggressively  · Sudden twisting, bending, or stretching from an accident, or accidental movement  · Poor posture  · Stretching or moving wrong, without noticing pain at the time  · Poor coordination, lack of regular exercise (check with your doctor about this)  · Spinal disc disease or arthritis  · Stress  Pain can also be related to pregnancy, or illness like appendicitis, bladder or kidney infections, pelvic infections, and many other things.  Acute back pain usually gets better in 1 to 2 weeks. Back pain related to disk disease, arthritis in the spinal joints or spinal stenosis (narrowing of the spinal canal) can become chronic and last for months or years.  Unless you had a physical injury (for example, a car accident or fall) X-rays are usually not needed for the initial evaluation of back pain. If pain continues and does not respond to medical treatment, X-rays and other tests may be  needed.  Home care  Try these home care recommendations:  · When in bed, try to find a position of comfort. A firm mattress is best. Try lying flat on your back with pillows under your knees. You can also try lying on your side with your knees bent up towards your chest and a pillow between your knees.  · At first, do not try to stretch out the sore spots. If there is a strain, it is not like the good soreness you get after exercising without an injury. In this case, stretching may make it worse.  · Avoid prolong sitting, long car rides, or travel. This puts more stress on the lower back than standing or walking.  · During the first 24 to 72 hours after an acute injury or flare up of chronic back pain, apply an ice pack to the painful area for 20 minutes and then remove it for 20 minutes. Do this over a period of 60 to 90 minutes or several times a day. This will reduce swelling and pain. Wrap the ice pack in a thin towel or plastic to protect your skin.  · You can start with ice, then switch to heat. Heat (hot shower, hot bath, or heating pad) reduces pain and works well for muscle spasms. Heat can be applied to the painful area for 20 minutes then remove it for 20 minutes. Do this over a period of 60 to 90 minutes or several times a day. Do not sleep on a heating pad. It can lead to skin burns or tissue damage.  · You can alternate ice and heat therapy. Talk with your doctor about the best treatment for your back pain.  · Therapeutic massage can help relax the back muscles without stretching them.  · Be aware of safe lifting methods and do not lift anything without stretching first.  Medicines  Talk to your doctor before using medicine, especially if you have other medical problems or are taking other medicines.  · You may use over-the-counter medicine as directed on the bottle to control pain, unless another pain medicine was prescribed. If you have chronic conditions like diabetes, liver or kidney disease,  stomach ulcers, or gastrointestinal bleeding, or are taking blood thinners, talk to your doctor before taking any medicine.  · Be careful if you are given a prescription medicines, narcotics, or medicine for muscle spasms. They can cause drowsiness, affect your coordination, reflexes, and judgement. Do not drive or operate heavy machinery.  Follow-up care  Follow up with your healthcare provider, or as advised.   A radiologist will review any X-rays that were taken. Your provide will notify you of any new findings that may affect your care.  Call 911  Call emergency services if any of the following occur:  · Trouble breathing  · Confusion  · Very drowsy or trouble awakening  · Fainting or loss of consciousness  · Rapid or very slow heart rate  · Loss of bowel or bladder control  When to seek medical advice  Call your healthcare provider right away if any of these occur:   · Pain becomes worse or spreads to your legs  · Weakness or numbness in one or both legs  · Numbness in the groin or genital area  Date Last Reviewed: 7/1/2016  © 8294-1642 [employer]. [address] [place] [address], [place] [address]. All rights reserved. This information is not intended as a substitute for professional medical care. Always follow your healthcare professional's instructions.

## 2017-04-25 ENCOUNTER — HOSPITAL ENCOUNTER (EMERGENCY)
Facility: HOSPITAL | Age: 42
Discharge: HOME | End: 2017-04-25
Attending: EMERGENCY MEDICINE
Payer: MEDICAID

## 2017-04-25 VITALS
HEART RATE: 96 BPM | DIASTOLIC BLOOD PRESSURE: 78 MMHG | TEMPERATURE: 98 F | OXYGEN SATURATION: 90 % | WEIGHT: 160 LBS | HEIGHT: 68 IN | RESPIRATION RATE: 18 BRPM | BODY MASS INDEX: 24.25 KG/M2 | SYSTOLIC BLOOD PRESSURE: 150 MMHG

## 2017-04-25 DIAGNOSIS — J45.901 ACUTE SEVERE EXACERBATION OF ASTHMA: ICD-10-CM

## 2017-04-25 DIAGNOSIS — R06.03 RESPIRATORY DISTRESS, ACUTE: Primary | ICD-10-CM

## 2017-04-25 PROCEDURE — 99900035 HC TECH TIME PER 15 MIN (STAT)

## 2017-04-25 PROCEDURE — 25000003 PHARM REV CODE 250: Performed by: EMERGENCY MEDICINE

## 2017-04-25 PROCEDURE — 27000190 HC CPAP FULL FACE MASK W/VALVE

## 2017-04-25 PROCEDURE — 25000242 PHARM REV CODE 250 ALT 637 W/ HCPCS: Performed by: EMERGENCY MEDICINE

## 2017-04-25 PROCEDURE — 94640 AIRWAY INHALATION TREATMENT: CPT

## 2017-04-25 PROCEDURE — 99285 EMERGENCY DEPT VISIT HI MDM: CPT | Mod: 25

## 2017-04-25 RX ORDER — ALBUTEROL SULFATE 90 UG/1
1-2 AEROSOL, METERED RESPIRATORY (INHALATION) EVERY 6 HOURS PRN
Qty: 1 INHALER | Refills: 2 | Status: SHIPPED | OUTPATIENT
Start: 2017-04-25 | End: 2017-05-25

## 2017-04-25 RX ORDER — ALBUTEROL SULFATE 0.83 MG/ML
2.5 SOLUTION RESPIRATORY (INHALATION) EVERY 6 HOURS PRN
Qty: 1 BOX | Refills: 2 | Status: SHIPPED | OUTPATIENT
Start: 2017-04-25 | End: 2018-04-25

## 2017-04-25 RX ORDER — HYDROCODONE BITARTRATE AND ACETAMINOPHEN 5; 325 MG/1; MG/1
2 TABLET ORAL
Status: COMPLETED | OUTPATIENT
Start: 2017-04-25 | End: 2017-04-25

## 2017-04-25 RX ORDER — PREDNISONE 20 MG/1
60 TABLET ORAL DAILY
Qty: 15 TABLET | Refills: 0 | Status: SHIPPED | OUTPATIENT
Start: 2017-04-25 | End: 2017-04-30

## 2017-04-25 RX ORDER — ALBUTEROL SULFATE 2.5 MG/.5ML
5 SOLUTION RESPIRATORY (INHALATION)
Status: COMPLETED | OUTPATIENT
Start: 2017-04-25 | End: 2017-04-25

## 2017-04-25 RX ADMIN — HYDROCODONE BITARTRATE AND ACETAMINOPHEN 2 TABLET: 5; 325 TABLET ORAL at 12:04

## 2017-04-25 RX ADMIN — ALBUTEROL SULFATE 5 MG: 2.5 SOLUTION RESPIRATORY (INHALATION) at 09:04

## 2017-04-25 NOTE — ED NOTES
Physician notified of pt's O2 sats 90% on RA.  Spoke with pt about RA sats.  Pt declined to stay in hospital per MD recommendation.  Pt still declines at this time.

## 2017-04-25 NOTE — ED NOTES
Pt complained of back pain 10/10 related to previous injury. MD made aware. No other complaints at this time.

## 2017-04-25 NOTE — DISCHARGE INSTRUCTIONS
Please continue home nebulizers every 4 hours.  Please return immediately if you get worse or if new problems develop.  Lots of liquids.  Return to the emergency room as needed.  Please follow-up with the primary care doctor above.  Rest.

## 2017-04-25 NOTE — ED AVS SNAPSHOT
[facility]  [place] [address]               Harry Barrientos Jr.   2017  8:12 AM   ED    Description:  Male : 1975   Department:  [facility]           Your Care was Coordinated By:     Provider Role From To    René Jett MD Attending Provider 17 0821 --      Reason for Visit     Shortness of Breath           Diagnoses this Visit        Comments    Respiratory distress, acute    -  Primary     Acute severe exacerbation of asthma           ED Disposition     None           To Do List           Follow-up Information     Follow up with Sirena Weber MD In 3 days.    Specialty:  Family Medicine    Contact information:    [place] [address]  [phone]         These Medications        Disp Refills Start End    predniSONE (DELTASONE) 20 MG tablet 15 tablet 0 2017    Take 3 tablets (60 mg total) by mouth once daily. - Oral    Pharmacy: [facility] [id number] [place] [address] [place] [address] AT [facility] Ph #: [phone]       albuterol 90 mcg/actuation inhaler 1 Inhaler 2 2017    Inhale 1-2 puffs into the lungs every 6 (six) hours as needed for Wheezing. - Inhalation    Pharmacy: [facility] [id number] [place] [address] [place] [address] AT [facility] Ph #: [phone]       albuterol (PROVENTIL) 2.5 mg /3 mL (0.083 %) nebulizer solution 1 Box 2 2017    Take 3 mLs (2.5 mg total) by nebulization every 6 (six) hours as needed for Wheezing. Rescue - Nebulization    Pharmacy: [facility] [address] [facility] [place] [id number] AT [facility] Ph #: [phone]         [facility]     [facility] Care [facility] -  Assistance  Unless otherwise directed by your provider, please contact [facility], our nurse care line that is available for   assistance.     Registered nurses in the [facility] provide: appointment scheduling, clinical advisement, health education, and other advisory services.  Call: [phone] (toll free)               Medications           Message regarding Medications     Verify the changes and/or additions to your medication regime listed below are the same as discussed with your clinician today.  If any of these changes or additions are incorrect, please notify your healthcare provider.        START taking these NEW medications        Refills    predniSONE (DELTASONE) 20 MG tablet 0    Sig: Take 3 tablets (60 mg total) by mouth once daily.    Class: Print    Route: Oral    albuterol 90 mcg/actuation inhaler 2    Sig: Inhale 1-2 puffs into the lungs every 6 (six) hours as needed for Wheezing.    Class: Print    Route: Inhalation    albuterol (PROVENTIL) 2.5 mg /3 mL (0.083 %) nebulizer solution 2    Sig: Take 3 mLs (2.5 mg total) by nebulization every 6 (six) hours as needed for Wheezing. Rescue    Class: Print    Route: Nebulization      These medications were administered today        Dose Freq    albuterol sulfate nebulizer solution 5 mg 5 mg ED 1 Time    Sig: Take 5 mg by nebulization ED 1 Time.    Class: Normal    Route: Nebulization           Verify that the below list of medications is an accurate representation of the medications you are currently taking.  If none reported, the list may be blank. If incorrect, please contact your healthcare provider. Carry this list with you in case of emergency.           Current Medications     albuterol (PROVENTIL) 2.5 mg /3 mL (0.083 %) nebulizer solution Take 3 mLs (2.5 mg total) by nebulization every 6 (six) hours as needed for Wheezing. Rescue    albuterol 90 mcg/actuation inhaler Inhale 1-2 puffs into the lungs every 6 (six) hours as needed for Wheezing.    albuterol sulfate nebulizer solution 5 mg Take 5 mg by nebulization ED 1 Time.    albuterol-ipratropium  "2.5mg-0.5mg/3mL (DUO-NEB) 0.5 mg-3 mg(2.5 mg base)/3 mL nebulizer solution Take 3 mLs by nebulization every 6 (six) hours while awake.    hydrocodone-acetaminophen 5-325mg (NORCO) 5-325 mg per tablet Take 2 tablets by mouth every 6 (six) hours as needed for Pain.    predniSONE (DELTASONE) 20 MG tablet Take 3 tablets (60 mg total) by mouth once daily.           Clinical Reference Information           Your Vitals Were     BP Pulse Temp Resp Height Weight    135/65 84 98.6 °F (37 °C) (Oral) 22 5' 8" (1.727 m) 72.6 kg (160 lb)    SpO2 BMI             92% 24.33 kg/m2         Allergies as of 4/25/2017     No Known Allergies      Immunizations Administered on Date of Encounter - 4/25/2017     None      ED Micro, Lab, POCT     None      ED Imaging Orders     Start Ordered       Status Ordering Provider    04/25/17 0851 04/25/17 0851  X-Ray Chest AP Portable  1 time imaging      Final result         Discharge Instructions       Please continue home nebulizers every 4 hours.  Please return immediately if you get worse or if new problems develop.  Lots of liquids.  Return to the emergency room as needed.  Please follow-up with the primary care doctor above.  Rest.     [facility] complies with applicable Federal civil rights laws and does not discriminate on the basis of race, color, national origin, age, disability, or sex.        Language Assistance Services     ATTENTION: Language assistance services are available, free of charge. Please call [phone].      ATENCIÓN: Si habla español, tiene a medina disposición servicios gratuitos de asistencia lingüística. Llame al [phone].     CHÚ Ý: N?u b?n nói Ti?ng Vi?t, có các d?ch v? h? tr? ngôn ng? mi?n phí dành cho b?n. G?i s? [phone].        "

## 2017-04-25 NOTE — ED TRIAGE NOTES
Pt presented to ED via EMS from home with c/o SOB since last night. Pt presented to ED on CPAP with Sat of 100%. EMS stated that pt's initial RA sat was 65% with expiratory wheezing to all fields. EMS administered 125mg Solu-Medrol, 25mg of Magnesium, Duoneb tx and CPAP. B/P 136/84,  RR30. Pt complains of back, chest and side pain 10/10 on pain scale.Pt has a hx of asthma.

## 2017-04-25 NOTE — ED PROVIDER NOTES
Encounter Date: 4/25/2017    SCRIBE #1 NOTE: I, Juan Daniel Guzman, am scribing for, and in the presence of,  René Jett MD. I have scribed the following portions of the note - Other sections scribed: HPI and ROS.       History     Chief Complaint   Patient presents with    Shortness of Breath     since this morning, 65% on RA on EMS arrival. Expiratory wheezes noted. Pt on Cpap, 100%. Received 125mg Solumedrol, 2G Magnesium, duoneb from EMS. C/o back and left side rib pain.      Review of patient's allergies indicates:  No Known Allergies  HPI Comments: Chief Complaint: SOB    HPI: This 41 y.o. Male with asthma presents to the ED c/o SOB. Symptoms began last night. Symptoms are constant, severe and progressively worsened since onset. There's an associated cough and chest congestion. There's no relief with Nebulizer treatment. Patient initial O2 stat was 65% on RA per EMS. Patient denies fever, chills, headache, nausea, vomiting, abdominal or chest pain.     The history is provided by the patient. No  was used.     Past Medical History:   Diagnosis Date    Asthma     Herniated lumbar intervertebral disc      Past Surgical History:   Procedure Laterality Date    APPENDECTOMY      BACK SURGERY       Family History   Problem Relation Age of Onset    Diabetes Mother     Heart disease Father      CABG     Social History   Substance Use Topics    Smoking status: Never Smoker    Smokeless tobacco: None    Alcohol use 0.0 oz/week     0 Standard drinks or equivalent per week      Comment: 2-3 beers/day; occasionally more on weekends     Review of Systems   Constitutional: Negative for chills and fever.   HENT: Positive for congestion. Negative for ear pain and sore throat.    Eyes: Negative for visual disturbance.   Respiratory: Positive for cough and shortness of breath.    Cardiovascular: Negative for chest pain.   Gastrointestinal: Negative for abdominal pain, constipation, diarrhea, nausea  and vomiting.   Genitourinary: Negative for difficulty urinating and dysuria.   Musculoskeletal: Negative for back pain.   Skin: Negative for rash.   Neurological: Negative for dizziness, weakness, light-headedness and headaches.       Physical Exam   Initial Vitals   BP Pulse Resp Temp SpO2   04/25/17 0810 04/25/17 0810 04/25/17 0810 04/25/17 0812 04/25/17 0810   136/84 110 20 98.6 °F (37 °C) 100 %     Physical Exam  The patient was examined specifically for the following:   General:No significant distress, Good color, Warm and dry. Head and neck:Scalp atraumatic, Neck supple. Neurological:Appropriate conversation, Gross motor deficits. Eyes:Conjugate gaze, Clear corneas. ENT: No epistaxis. Cardiac: Regular rate and rhythm, Grossly normal heart tones. Pulmonary: Wheezing, Rales. Gastrointestinal: Abdominal tenderness, Abdominal distention. Musculoskeletal: Extremity deformity, Apparent pain with range of motion of the joints. Skin: Rash.   The findings on examination were normal except for the following: Patient's heart rate is 110.  The patient is on BiPAP.  The patient has diffuse scattered wheezing.  Abdomen is nontender.  Heart tones are remarkable for regular tachycardia, they were otherwise unremarkable.  The abdomen is nontender.  There is no pedal edema.  The patient seems comfortable on his BiPAP at this time.   ED Course   Procedures  Labs Reviewed - No data to display       X-Rays:   Independently Interpreted Readings:   Other Readings:  Chest x-ray fails to reveal pneumothorax pneumonia pleural effusion    Medical decision making: Given the above this patient has a history of severe asthma.  He's been intubated in the past.  He require BiPAP today.  He had oxygen saturations of 60% in the field.  He has some facial congestion.  Chest x-ray is negative for pneumothorax pneumonia pleural effusion patient responded nicely to bronchodilator therapy IV steroids and IV magnesium.  I suggested to the patient  that he may be best served by admission for continued IV steroids and nebulizer treatments.  He declined wishing instead to go home.  He is awake alert and seems capable of making intelligent decisions in his own best interest he relates that he has children at home that he has to care for.  He was invited to return if he changed his mind.  I will treat him with albuterol and prednisone and have him followed by primary care.                Scribe Attestation:   Scribe #1: I performed the above scribed service and the documentation accurately describes the services I performed. I attest to the accuracy of the note.    Attending Attestation:           Physician Attestation for Scribe:  Physician Attestation Statement for Scribe #1: I, René Jett MD, reviewed documentation, as scribed by Juan Daniel Guzman in my presence, and it is both accurate and complete.                 ED Course     Clinical Impression:   The primary encounter diagnosis was Respiratory distress, acute. A diagnosis of Acute severe exacerbation of asthma was also pertinent to this visit.          René Jett MD  04/25/17 5224

## 2017-08-27 ENCOUNTER — HOSPITAL ENCOUNTER (INPATIENT)
Facility: HOSPITAL | Age: 42
LOS: 1 days | Discharge: HOME | DRG: 203 | End: 2017-08-28
Attending: EMERGENCY MEDICINE | Admitting: INTERNAL MEDICINE
Payer: MEDICAID

## 2017-08-27 DIAGNOSIS — J45.51 SEVERE PERSISTENT ASTHMA WITH ACUTE EXACERBATION: Primary | ICD-10-CM

## 2017-08-27 PROBLEM — J20.9 ACUTE BRONCHITIS: Status: ACTIVE | Noted: 2017-08-27

## 2017-08-27 PROBLEM — I10 HTN (HYPERTENSION), BENIGN: Status: ACTIVE | Noted: 2017-08-27

## 2017-08-27 LAB
ANION GAP SERPL CALC-SCNC: 11 MMOL/L
BASOPHILS # BLD AUTO: 0.1 K/UL
BASOPHILS NFR BLD: 0.6 %
BUN SERPL-MCNC: 14 MG/DL
CALCIUM SERPL-MCNC: 9.3 MG/DL
CHLORIDE SERPL-SCNC: 102 MMOL/L
CO2 SERPL-SCNC: 25 MMOL/L
CREAT SERPL-MCNC: 1.1 MG/DL
DIFFERENTIAL METHOD: ABNORMAL
EOSINOPHIL # BLD AUTO: 2.2 K/UL
EOSINOPHIL NFR BLD: 13.5 %
ERYTHROCYTE [DISTWIDTH] IN BLOOD BY AUTOMATED COUNT: 16.3 %
EST. GFR  (AFRICAN AMERICAN): >60 ML/MIN/1.73 M^2
EST. GFR  (NON AFRICAN AMERICAN): >60 ML/MIN/1.73 M^2
GLUCOSE SERPL-MCNC: 109 MG/DL
HCT VFR BLD AUTO: 50.9 %
HGB BLD-MCNC: 14 G/DL
HYPOCHROMIA BLD QL SMEAR: ABNORMAL
LYMPHOCYTES # BLD AUTO: 1.9 K/UL
LYMPHOCYTES NFR BLD: 11.8 %
MCH RBC QN AUTO: 24.6 PG
MCHC RBC AUTO-ENTMCNC: 27.5 G/DL
MCV RBC AUTO: 89 FL
MONOCYTES # BLD AUTO: 0.8 K/UL
MONOCYTES NFR BLD: 4.8 %
NEUTROPHILS # BLD AUTO: 11.1 K/UL
NEUTROPHILS NFR BLD: 69.3 %
PLATELET # BLD AUTO: 330 K/UL
PMV BLD AUTO: 14 FL
POTASSIUM SERPL-SCNC: 4.4 MMOL/L
RBC # BLD AUTO: 5.7 M/UL
SODIUM SERPL-SCNC: 138 MMOL/L
WBC # BLD AUTO: 15.96 K/UL

## 2017-08-27 PROCEDURE — 25000003 PHARM REV CODE 250: Performed by: INTERNAL MEDICINE

## 2017-08-27 PROCEDURE — 99900035 HC TECH TIME PER 15 MIN (STAT)

## 2017-08-27 PROCEDURE — 85025 COMPLETE CBC W/AUTO DIFF WBC: CPT

## 2017-08-27 PROCEDURE — 25000242 PHARM REV CODE 250 ALT 637 W/ HCPCS: Performed by: INTERNAL MEDICINE

## 2017-08-27 PROCEDURE — 25000242 PHARM REV CODE 250 ALT 637 W/ HCPCS: Performed by: EMERGENCY MEDICINE

## 2017-08-27 PROCEDURE — 94640 AIRWAY INHALATION TREATMENT: CPT

## 2017-08-27 PROCEDURE — 94761 N-INVAS EAR/PLS OXIMETRY MLT: CPT

## 2017-08-27 PROCEDURE — 96374 THER/PROPH/DIAG INJ IV PUSH: CPT

## 2017-08-27 PROCEDURE — 63600175 PHARM REV CODE 636 W HCPCS: Performed by: EMERGENCY MEDICINE

## 2017-08-27 PROCEDURE — 94644 CONT INHLJ TX 1ST HOUR: CPT

## 2017-08-27 PROCEDURE — 94760 N-INVAS EAR/PLS OXIMETRY 1: CPT

## 2017-08-27 PROCEDURE — 27000221 HC OXYGEN, UP TO 24 HOURS

## 2017-08-27 PROCEDURE — 99285 EMERGENCY DEPT VISIT HI MDM: CPT | Mod: 25

## 2017-08-27 PROCEDURE — 27100107 HC POCKET PEAK FLOW METER

## 2017-08-27 PROCEDURE — 12000002 HC ACUTE/MED SURGE SEMI-PRIVATE ROOM

## 2017-08-27 PROCEDURE — 80048 BASIC METABOLIC PNL TOTAL CA: CPT

## 2017-08-27 RX ORDER — DOXYCYCLINE HYCLATE 100 MG
100 TABLET ORAL EVERY 12 HOURS
Status: DISCONTINUED | OUTPATIENT
Start: 2017-08-27 | End: 2017-08-28 | Stop reason: HOSPADM

## 2017-08-27 RX ORDER — PREDNISONE 20 MG/1
60 TABLET ORAL
Status: COMPLETED | OUTPATIENT
Start: 2017-08-27 | End: 2017-08-27

## 2017-08-27 RX ORDER — ENOXAPARIN SODIUM 100 MG/ML
40 INJECTION SUBCUTANEOUS EVERY 24 HOURS
Status: DISCONTINUED | OUTPATIENT
Start: 2017-08-27 | End: 2017-08-28 | Stop reason: HOSPADM

## 2017-08-27 RX ORDER — METHYLPREDNISOLONE SODIUM SUCCINATE 125 MG/2ML
125 INJECTION INTRAMUSCULAR; INTRAVENOUS EVERY 8 HOURS
Status: DISCONTINUED | OUTPATIENT
Start: 2017-08-27 | End: 2017-08-28 | Stop reason: HOSPADM

## 2017-08-27 RX ORDER — IPRATROPIUM BROMIDE AND ALBUTEROL SULFATE 2.5; .5 MG/3ML; MG/3ML
3 SOLUTION RESPIRATORY (INHALATION)
Status: COMPLETED | OUTPATIENT
Start: 2017-08-27 | End: 2017-08-27

## 2017-08-27 RX ORDER — ALBUTEROL SULFATE 2.5 MG/.5ML
10 SOLUTION RESPIRATORY (INHALATION)
Status: DISCONTINUED | OUTPATIENT
Start: 2017-08-27 | End: 2017-08-27

## 2017-08-27 RX ORDER — IPRATROPIUM BROMIDE 0.5 MG/2.5ML
1 SOLUTION RESPIRATORY (INHALATION)
Status: COMPLETED | OUTPATIENT
Start: 2017-08-27 | End: 2017-08-27

## 2017-08-27 RX ORDER — ALBUTEROL SULFATE 2.5 MG/.5ML
15 SOLUTION RESPIRATORY (INHALATION)
Status: COMPLETED | OUTPATIENT
Start: 2017-08-27 | End: 2017-08-27

## 2017-08-27 RX ORDER — ALBUTEROL SULFATE 2.5 MG/.5ML
2.5 SOLUTION RESPIRATORY (INHALATION) EVERY 4 HOURS
Status: DISCONTINUED | OUTPATIENT
Start: 2017-08-27 | End: 2017-08-28 | Stop reason: HOSPADM

## 2017-08-27 RX ORDER — HYDROCODONE BITARTRATE AND ACETAMINOPHEN 5; 325 MG/1; MG/1
1 TABLET ORAL EVERY 4 HOURS PRN
Status: DISCONTINUED | OUTPATIENT
Start: 2017-08-27 | End: 2017-08-28 | Stop reason: HOSPADM

## 2017-08-27 RX ADMIN — ALBUTEROL SULFATE 2.5 MG: 2.5 SOLUTION RESPIRATORY (INHALATION) at 11:08

## 2017-08-27 RX ADMIN — HYDROCODONE BITARTRATE AND ACETAMINOPHEN 1 TABLET: 5; 325 TABLET ORAL at 05:08

## 2017-08-27 RX ADMIN — DOXYCYCLINE HYCLATE 100 MG: 100 TABLET, COATED ORAL at 09:08

## 2017-08-27 RX ADMIN — HYDROCODONE BITARTRATE AND ACETAMINOPHEN 1 TABLET: 5; 325 TABLET ORAL at 09:08

## 2017-08-27 RX ADMIN — PREDNISONE 60 MG: 20 TABLET ORAL at 03:08

## 2017-08-27 RX ADMIN — IPRATROPIUM BROMIDE AND ALBUTEROL SULFATE 3 ML: .5; 3 SOLUTION RESPIRATORY (INHALATION) at 04:08

## 2017-08-27 RX ADMIN — METHYLPREDNISOLONE SODIUM SUCCINATE 125 MG: 125 INJECTION, POWDER, FOR SOLUTION INTRAMUSCULAR; INTRAVENOUS at 10:08

## 2017-08-27 RX ADMIN — ENOXAPARIN SODIUM 40 MG: 100 INJECTION SUBCUTANEOUS at 05:08

## 2017-08-27 RX ADMIN — IPRATROPIUM BROMIDE AND ALBUTEROL SULFATE 3 ML: .5; 3 SOLUTION RESPIRATORY (INHALATION) at 03:08

## 2017-08-27 RX ADMIN — IPRATROPIUM BROMIDE 1 MG: 0.5 SOLUTION RESPIRATORY (INHALATION) at 04:08

## 2017-08-27 RX ADMIN — METHYLPREDNISOLONE SODIUM SUCCINATE 125 MG: 125 INJECTION, POWDER, FOR SOLUTION INTRAMUSCULAR; INTRAVENOUS at 01:08

## 2017-08-27 RX ADMIN — ALBUTEROL SULFATE 15 MG: 2.5 SOLUTION RESPIRATORY (INHALATION) at 04:08

## 2017-08-27 RX ADMIN — DOXYCYCLINE HYCLATE 100 MG: 100 TABLET, COATED ORAL at 10:08

## 2017-08-27 RX ADMIN — METHYLPREDNISOLONE SODIUM SUCCINATE 125 MG: 125 INJECTION, POWDER, FOR SOLUTION INTRAMUSCULAR; INTRAVENOUS at 06:08

## 2017-08-27 RX ADMIN — ALBUTEROL SULFATE 10 MG: 2.5 SOLUTION RESPIRATORY (INHALATION) at 07:08

## 2017-08-27 RX ADMIN — ALBUTEROL SULFATE 2.5 MG: 2.5 SOLUTION RESPIRATORY (INHALATION) at 07:08

## 2017-08-27 RX ADMIN — ALBUTEROL SULFATE 2.5 MG: 2.5 SOLUTION RESPIRATORY (INHALATION) at 03:08

## 2017-08-27 NOTE — ASSESSMENT & PLAN NOTE
Secondary to combination of asthma and possible acute bronchitis. After treatment with steroids/breathing treatments- patient much improved.

## 2017-08-27 NOTE — H&P
[facility] Hospital Medicine  History & Physical    Patient Name: Harry Barrientos Jr.  MRN: [id number]  Admission Date: 8/27/2017  Attending Physician: Fred Redmond MD   Primary Care Provider: Primary Doctor No         Patient information was obtained from patient and ER records.     Subjective:     Principal Problem:Severe persistent asthma with acute exacerbation    Chief Complaint:   Chief Complaint   Patient presents with    Shortness of Breath     pt reports feeling SOB over the past month but worsening tonight; pt has hx of asthma and took about 10 albuterol treatments today at home with no relief; pt 88% on room air        HPI: Mr. Barrientos is a 40 yo M with a history of asthma. He presents with a one day history of SOB and a week history of productive cough. He notes that one of his children has the same illness.  He uses his son's nebulizer PRN. He presents to the ED in some respiratory distress. He was given steroids and breathing treatments. He is now in his room and feels much better and was walt asking to go home later. He notes his cough is productive of greenish sputum. No sepsis. CXR was negative and the patient is non-ill appearing.      Past Medical History:   Diagnosis Date    Asthma     Herniated lumbar intervertebral disc        Past Surgical History:   Procedure Laterality Date    APPENDECTOMY      BACK SURGERY         Review of patient's allergies indicates:  No Known Allergies    No current facility-administered medications on file prior to encounter.      Current Outpatient Prescriptions on File Prior to Encounter   Medication Sig    albuterol (PROVENTIL) 2.5 mg /3 mL (0.083 %) nebulizer solution Take 3 mLs (2.5 mg total) by nebulization every 6 (six) hours as needed for Wheezing. Rescue    albuterol-ipratropium 2.5mg-0.5mg/3mL (DUO-NEB) 0.5 mg-3 mg(2.5 mg base)/3 mL nebulizer solution Take 3 mLs by nebulization every 6 (six) hours while awake.    [DISCONTINUED]  hydrocodone-acetaminophen 5-325mg (NORCO) 5-325 mg per tablet Take 2 tablets by mouth every 6 (six) hours as needed for Pain.     Family History     Problem Relation (Age of Onset)    Diabetes Mother    Heart disease Father        Social History Main Topics    Smoking status: Never Smoker    Smokeless tobacco: Never Used    Alcohol use 0.0 oz/week      Comment: 2-3 beers/day; occasionally more on weekends    Drug use: No      Comment: Occasional Percocet use, heroine    Sexual activity: Yes     Partners: Female     Review of Systems   Constitutional: Negative for activity change, appetite change, chills, diaphoresis, fatigue, fever and unexpected weight change.   Respiratory: Positive for cough, shortness of breath and wheezing. Negative for apnea, choking, chest tightness and stridor.    Cardiovascular: Negative for chest pain, palpitations and leg swelling.   Gastrointestinal: Negative for abdominal distention, abdominal pain, anal bleeding, diarrhea, nausea, rectal pain and vomiting.   Endocrine: Negative for cold intolerance.   Genitourinary: Negative for difficulty urinating.   Musculoskeletal: Negative for arthralgias.   Psychiatric/Behavioral: Negative for agitation and behavioral problems.     Objective:     Vital Signs (Most Recent):  Temp: 98.6 °F (37 °C) (08/27/17 0600)  Pulse: 96 (08/27/17 0745)  Resp: (!) 24 (08/27/17 0742)  BP: (!) 136/94 (08/27/17 0745)  SpO2: 97 % (08/27/17 0745) Vital Signs (24h Range):  Temp:  [98.6 °F (37 °C)-99.1 °F (37.3 °C)] 98.6 °F (37 °C)  Pulse:  [] 96  Resp:  [20-24] 24  SpO2:  [88 %-100 %] 97 %  BP: (136-169)/() 136/94     Weight: 73.5 kg (162 lb)  Body mass index is 24.63 kg/m².    Physical Exam   Constitutional: He is oriented to person, place, and time. He appears well-nourished.   HENT:   Head: Normocephalic and atraumatic.   Cardiovascular: Normal rate.  Exam reveals no gallop and no friction rub.    Pulmonary/Chest: Effort normal. No respiratory  distress. He has no rales. He exhibits no tenderness.   Abdominal: Soft. Bowel sounds are normal. He exhibits no distension. There is no tenderness.   Neurological: He is alert and oriented to person, place, and time.   Skin: Skin is warm and dry.   Psychiatric: His behavior is normal.   Vitals reviewed.       Significant Labs:   BMP:   Recent Labs  Lab 08/27/17 0428         K 4.4      CO2 25   BUN 14   CREATININE 1.1   CALCIUM 9.3     CBC:   Recent Labs  Lab 08/27/17 0428   WBC 15.96*   HGB 14.0   HCT 50.9          Significant Imaging:  CXR negative.   All labs reviewed and interpreted by myself.     Assessment/Plan:     Shortness of breath    Secondary to combination of asthma and possible acute bronchitis. After treatment with steroids/breathing treatments- patient much improved.            Respiratory distress    Resolved. From #1.          Mild intermittent reactive airway disease with status asthmaticus    As discussed.  Clinically improved.           Acute bronchitis    Continue breathing treatments and steroids today   Sepsis not present.  No fevers.  Doxy added.         HTN (hypertension), benign    Not on any meds. Will follow for now. May need med on discharge          Tachycardia    Should improve with correction of hypoxia.              VTE Risk Mitigation         Ordered     enoxaparin injection 40 mg  Daily     Route:  Subcutaneous        08/27/17 0803     Medium Risk of VTE  Once      08/27/17 0803      Clinically improved. Asking to go home even. Expect short hospital stay.  Will re-evaluate on 8/28.          Fred De Los Santos MD  Department of Hospital Medicine   [facility]

## 2017-08-27 NOTE — ED PROVIDER NOTES
Encounter Date: 8/27/2017    SCRIBE #1 NOTE: I, Leobardo Cardoso, am scribing for, and in the presence of,  Jairo Butler MD. I have scribed the following portions of the note - Other sections scribed: HPI, ROS.       History     Chief Complaint   Patient presents with    Shortness of Breath     pt reports feeling SOB over the past month but worsening tonight; pt has hx of asthma and took about 10 albuterol treatments today at home with no relief; pt 88% on room air     CC: Shortness of Breath    This 41 year old male with asthma presents to the ED complaining of a 1 month history of progressively worsening shortness of breath. Pt used his albuterol pump 10x today. He was 88% on room air upon arrival. Pt was last admitted to the hospital for his asthma in 2015. He also has a history of intubation and coma. No other symptoms reported.               The history is provided by the patient. No  was used.     Review of patient's allergies indicates:  No Known Allergies  Past Medical History:   Diagnosis Date    Asthma     Herniated lumbar intervertebral disc     Hypertension      Past Surgical History:   Procedure Laterality Date    APPENDECTOMY      BACK SURGERY       Family History   Problem Relation Age of Onset    Diabetes Mother     Heart disease Father      CABG     Social History   Substance Use Topics    Smoking status: Never Smoker    Smokeless tobacco: Never Used    Alcohol use 0.0 oz/week      Comment: 2-3 beers/day; occasionally more on weekends     Review of Systems   Constitutional: Negative for fever.   HENT: Negative for sore throat.    Respiratory: Positive for shortness of breath and wheezing.    Cardiovascular: Negative for chest pain.   Gastrointestinal: Negative for nausea.   Genitourinary: Negative for dysuria.   Musculoskeletal: Negative for back pain.   Skin: Negative for rash.   Neurological: Negative for weakness.   Hematological: Does not bruise/bleed  easily.       Physical Exam     Initial Vitals [08/27/17 0322]   BP Pulse Resp Temp SpO2   (!) 145/114 (!) 112 (!) 22 99.1 °F (37.3 °C) (!) 88 %      MAP       124.33         Physical Exam    Vitals reviewed.  Constitutional: He appears well-developed and well-nourished.   HENT:   Head: Normocephalic and atraumatic.   Eyes: EOM are normal. Pupils are equal, round, and reactive to light.   Neck: Normal range of motion. Neck supple.   Cardiovascular: Regular rhythm, normal heart sounds and intact distal pulses. Tachycardia present.    Pulmonary/Chest: He is in respiratory distress. He has wheezes.   Speaking in short choppy sentences   Abdominal: Soft. Bowel sounds are normal.   Musculoskeletal: Normal range of motion.   Neurological: He is alert and oriented to person, place, and time.   Skin: Skin is warm and dry.   Psychiatric: He has a normal mood and affect.         ED Course   Procedures  Labs Reviewed   CBC W/ AUTO DIFFERENTIAL - Abnormal; Notable for the following:        Result Value    WBC 15.96 (*)     MCH 24.6 (*)     MCHC 27.5 (*)     RDW 16.3 (*)     MPV 14.0 (*)     Gran # 11.1 (*)     Eos # 2.2 (*)     Lymph% 11.8 (*)     Eosinophil% 13.5 (*)     All other components within normal limits   BASIC METABOLIC PANEL               Medical decision-making:    The patient received a medical screening exam. If performed, the EKG was independently evaluated by me and is pending final cardiology evaluation.  If performed, all radiographic studies were independently evaluated by me and are pending final radiology evaluation. If labs were ordered, they were reviewed. Vital signs are independently assessed by me.  If performed, the pulse oximetry was independently evaluated by me.  I decided to obtain the patient's past medical record.  If available, I reviewed the patient's past medical record, including most recent labs and radiology reports.    This is an emergent evaluation of a patient with shortness of breath  and wheezing that appears to have an asthma exacerbation.   I decided to obtain and review the old medical record.    Based upon the patient's history, physical exam, evaluation in the ED, and response to medications I feel the patient is NOT stable for discharge.  I do not think the patient has pneumonia, pneumothorax based upon xray findings.     After continuous nebs, wheezing still not significantly improved.   Pt is high risk with hx of intubation and hospitalization. Will admit to IM.  Will cont q4 nebs and steroids.     The results and physical exam findings were reviewed with the patient. Pt agrees with assessment, disposition and treatment plan and has no further questions or complaints at this time.        DANY Butler M.D. 9:53 PM 8/28/2017                Scribe Attestation:   Scribe #1: I performed the above scribed service and the documentation accurately describes the services I performed. I attest to the accuracy of the note.    Attending Attestation:           Physician Attestation for Scribe:  Physician Attestation Statement for Scribe #1: I, Jairo Butler MD, reviewed documentation, as scribed by Leobardo Cardoso in my presence, and it is both accurate and complete.                 ED Course     Clinical Impression:   The encounter diagnosis was Severe persistent asthma with acute exacerbation.                           Jairo Butler MD  08/28/17 5651

## 2017-08-27 NOTE — SUBJECTIVE & OBJECTIVE
Past Medical History:   Diagnosis Date    Asthma     Herniated lumbar intervertebral disc        Past Surgical History:   Procedure Laterality Date    APPENDECTOMY      BACK SURGERY         Review of patient's allergies indicates:  No Known Allergies    No current facility-administered medications on file prior to encounter.      Current Outpatient Prescriptions on File Prior to Encounter   Medication Sig    albuterol (PROVENTIL) 2.5 mg /3 mL (0.083 %) nebulizer solution Take 3 mLs (2.5 mg total) by nebulization every 6 (six) hours as needed for Wheezing. Rescue    albuterol-ipratropium 2.5mg-0.5mg/3mL (DUO-NEB) 0.5 mg-3 mg(2.5 mg base)/3 mL nebulizer solution Take 3 mLs by nebulization every 6 (six) hours while awake.    [DISCONTINUED] hydrocodone-acetaminophen 5-325mg (NORCO) 5-325 mg per tablet Take 2 tablets by mouth every 6 (six) hours as needed for Pain.     Family History     Problem Relation (Age of Onset)    Diabetes Mother    Heart disease Father        Social History Main Topics    Smoking status: Never Smoker    Smokeless tobacco: Never Used    Alcohol use 0.0 oz/week      Comment: 2-3 beers/day; occasionally more on weekends    Drug use: No      Comment: Occasional Percocet use, heroine    Sexual activity: Yes     Partners: Female     Review of Systems   Constitutional: Negative for activity change, appetite change, chills, diaphoresis, fatigue, fever and unexpected weight change.   Respiratory: Positive for cough, shortness of breath and wheezing. Negative for apnea, choking, chest tightness and stridor.    Cardiovascular: Negative for chest pain, palpitations and leg swelling.   Gastrointestinal: Negative for abdominal distention, abdominal pain, anal bleeding, diarrhea, nausea, rectal pain and vomiting.   Endocrine: Negative for cold intolerance.   Genitourinary: Negative for difficulty urinating.   Musculoskeletal: Negative for arthralgias.   Psychiatric/Behavioral: Negative for  agitation and behavioral problems.     Objective:     Vital Signs (Most Recent):  Temp: 98.6 °F (37 °C) (08/27/17 0600)  Pulse: 96 (08/27/17 0745)  Resp: (!) 24 (08/27/17 0742)  BP: (!) 136/94 (08/27/17 0745)  SpO2: 97 % (08/27/17 0745) Vital Signs (24h Range):  Temp:  [98.6 °F (37 °C)-99.1 °F (37.3 °C)] 98.6 °F (37 °C)  Pulse:  [] 96  Resp:  [20-24] 24  SpO2:  [88 %-100 %] 97 %  BP: (136-169)/() 136/94     Weight: 73.5 kg (162 lb)  Body mass index is 24.63 kg/m².    Physical Exam   Constitutional: He is oriented to person, place, and time. He appears well-nourished.   HENT:   Head: Normocephalic and atraumatic.   Cardiovascular: Normal rate.  Exam reveals no gallop and no friction rub.    Pulmonary/Chest: Effort normal. No respiratory distress. He has no rales. He exhibits no tenderness.   Abdominal: Soft. Bowel sounds are normal. He exhibits no distension. There is no tenderness.   Neurological: He is alert and oriented to person, place, and time.   Skin: Skin is warm and dry.   Psychiatric: His behavior is normal.   Vitals reviewed.       Significant Labs:   BMP:   Recent Labs  Lab 08/27/17 0428         K 4.4      CO2 25   BUN 14   CREATININE 1.1   CALCIUM 9.3     CBC:   Recent Labs  Lab 08/27/17 0428   WBC 15.96*   HGB 14.0   HCT 50.9          Significant Imaging:  CXR negative.   All labs reviewed and interpreted by myself.

## 2017-08-27 NOTE — HOSPITAL COURSE
The patient was admitted for evaluation and treatment of acute respiratory distress from asthma/acute bronchitis. He was started on steroids, breathing treatments as well as doxy.  He clinically improved rapidly. The following day, the patient was resting on RA. No wheezing on exam. He was requesting discharge to home and stated all symptoms had resolved. He will be sent home on a steroid taper with Doxy and inhaler.  Low NA diet. Activity as tolerated. Follow up with PCP in one week

## 2017-08-27 NOTE — PLAN OF CARE
Problem: Patient Care Overview  Goal: Plan of Care Review  Outcome: Ongoing (interventions implemented as appropriate)  Pt admitted to unit. Venti Mask discontinued. NC applied at 2.5L/m. Breath sounds audible wheezes noted. SpO2 ranges 94-98%. Productive cough noted. Yellow-grayish thick sputum noted. VSS. Schedule breathing treatments q4h. C/O constant lower back pain. Physician notified and new medication ordered for pain management. VTE prophylaxsis initiated. No skin breakdown noted.    08/27/17 1313   Coping/Psychosocial   Plan Of Care Reviewed With patient

## 2017-08-27 NOTE — HPI
Mr. Barrientos is a 40 yo M with a history of asthma. He presents with a one day history of SOB and a week history of productive cough. He notes that one of his children has the same illness.  He uses his son's nebulizer PRN. He presents to the ED in some respiratory distress. He was given steroids and breathing treatments. He is now in his room and feels much better and was walt asking to go home later. He notes his cough is productive of greenish sputum. No sepsis. CXR was negative and the patient is non-ill appearing.

## 2017-08-27 NOTE — PLAN OF CARE
08/27/17 1652   Discharge Assessment   Confirmed/corrected address and phone number on facesheet? Yes   Assessment information obtained from? Patient   Expected Length of Stay (days) 2   Prior to hospitilization cognitive status: Alert/Oriented   Prior to hospitalization functional status: Independent   Current cognitive status: Alert/Oriented   Current Functional Status: Independent   Lives With spouse   Able to Return to Prior Arrangements yes   Is patient able to care for self after discharge? Yes   Who are your caregiver(s) and their phone number(s)? Spouse Fang Barrientos [phone]   Patient's perception of discharge disposition admitted as an inpatient;home or selfcare   Readmission Within The Last 30 Days no previous admission in last 30 days   Patient currently being followed by outpatient case management? No   Patient currently receives any other outside agency services? No   Equipment Currently Used at Home none  (Patient stated that he needs a nebulizer. He has been using son's)   Do you have any problems affording any of your prescribed medications? No   Is the patient taking medications as prescribed? yes   Does the patient have transportation home? Yes   Transportation Available family or friend will provide   Does the patient receive services at the [facility]? No   Discharge Plan A Home with family   Patient/Family In Agreement With Plan yes   Does the patient have transportation to healthcare appointments? Yes

## 2017-08-27 NOTE — ED TRIAGE NOTES
Pt brought straight back from triage with room air sats 88%. Placed on NRB and sats imrpoved to 99%. History asthma and used inhaler about 10 times today. Ongoing over past week with symptoms getting progressively worse. resp labored and speaking in short phrases. Noted wheezing throughout.

## 2017-08-27 NOTE — ED NOTES
Pt report received from KATHERINE Moreau. Pt is sitting up in bed, eyes are closed, siderails are up x2, call light in reach

## 2017-08-28 VITALS
SYSTOLIC BLOOD PRESSURE: 131 MMHG | DIASTOLIC BLOOD PRESSURE: 79 MMHG | TEMPERATURE: 98 F | HEIGHT: 68 IN | OXYGEN SATURATION: 94 % | BODY MASS INDEX: 21.87 KG/M2 | WEIGHT: 144.31 LBS | RESPIRATION RATE: 18 BRPM | HEART RATE: 99 BPM

## 2017-08-28 PROBLEM — J20.9 ACUTE BRONCHITIS: Status: RESOLVED | Noted: 2017-08-27 | Resolved: 2017-08-28

## 2017-08-28 PROCEDURE — 25000003 PHARM REV CODE 250: Performed by: INTERNAL MEDICINE

## 2017-08-28 PROCEDURE — 25000242 PHARM REV CODE 250 ALT 637 W/ HCPCS: Performed by: INTERNAL MEDICINE

## 2017-08-28 PROCEDURE — 63600175 PHARM REV CODE 636 W HCPCS: Performed by: EMERGENCY MEDICINE

## 2017-08-28 PROCEDURE — 94640 AIRWAY INHALATION TREATMENT: CPT

## 2017-08-28 PROCEDURE — 94761 N-INVAS EAR/PLS OXIMETRY MLT: CPT

## 2017-08-28 RX ORDER — DOXYCYCLINE HYCLATE 100 MG
100 TABLET ORAL EVERY 12 HOURS
Qty: 14 TABLET | Refills: 0 | Status: SHIPPED | OUTPATIENT
Start: 2017-08-28 | End: 2017-09-04

## 2017-08-28 RX ORDER — ALBUTEROL SULFATE 90 UG/1
2 AEROSOL, METERED RESPIRATORY (INHALATION) EVERY 6 HOURS PRN
Qty: 18 G | Refills: 5 | Status: SHIPPED | OUTPATIENT
Start: 2017-08-28 | End: 2018-08-28

## 2017-08-28 RX ORDER — RAMELTEON 8 MG/1
8 TABLET ORAL NIGHTLY PRN
Status: DISCONTINUED | OUTPATIENT
Start: 2017-08-28 | End: 2017-08-28 | Stop reason: HOSPADM

## 2017-08-28 RX ORDER — PREDNISONE 20 MG/1
TABLET ORAL
Qty: 18 TABLET | Refills: 0 | Status: SHIPPED | OUTPATIENT
Start: 2017-08-28 | End: 2018-06-27 | Stop reason: ALTCHOICE

## 2017-08-28 RX ADMIN — HYDROCODONE BITARTRATE AND ACETAMINOPHEN 1 TABLET: 5; 325 TABLET ORAL at 06:08

## 2017-08-28 RX ADMIN — ALBUTEROL SULFATE 2.5 MG: 2.5 SOLUTION RESPIRATORY (INHALATION) at 07:08

## 2017-08-28 RX ADMIN — HYDROCODONE BITARTRATE AND ACETAMINOPHEN 1 TABLET: 5; 325 TABLET ORAL at 12:08

## 2017-08-28 RX ADMIN — RAMELTEON 8 MG: 8 TABLET, FILM COATED ORAL at 12:08

## 2017-08-28 RX ADMIN — ALBUTEROL SULFATE 2.5 MG: 2.5 SOLUTION RESPIRATORY (INHALATION) at 03:08

## 2017-08-28 RX ADMIN — DOXYCYCLINE HYCLATE 100 MG: 100 TABLET, COATED ORAL at 08:08

## 2017-08-28 RX ADMIN — METHYLPREDNISOLONE SODIUM SUCCINATE 125 MG: 125 INJECTION, POWDER, FOR SOLUTION INTRAMUSCULAR; INTRAVENOUS at 06:08

## 2017-08-28 NOTE — PROGRESS NOTES
Patient discharged this a.m. prior to SW contact.   Maddie Hitchcock LMSW, JW-RAFY, [facility]  08/28/2017

## 2017-08-28 NOTE — DISCHARGE INSTRUCTIONS
Patient given dc instructions with prescriptions and education. Patient verbalizing understanding. Awaiting spouse to . Patient ok'd to leave by m.d. Without case management f/u

## 2017-08-28 NOTE — PLAN OF CARE
Problem: Asthma (Adult)  Goal: Signs and Symptoms of Listed Potential Problems Will be Absent, Minimized or Managed (Asthma)  Signs and symptoms of listed potential problems will be absent, minimized or managed by discharge/transition of care (reference Asthma (Adult) CPG).   Outcome: Outcome(s) achieved Date Met: 08/28/17  Patient resting comfortably, denies complaints of pain, no SOB noted at rest or on ambulation. Respirations even and unlabored on room air. Respiratory tx admin this a.m. By RT. Patient in with Dr. De Los Santos this am. Dc orders written and given to patient. Patient given information to f/u with PCP in 1 week. Patient states he has appt on Thursday.    08/28/17 0919   Asthma   Problems Assessed (Asthma) hypoxia/hypoxemia;recurrent exacerbation   Problems Present (Asthma) none

## 2017-08-28 NOTE — NURSING
Patient given dc instructions to include new prescriptions and education. Patient verbalizing understanding

## 2017-08-28 NOTE — PHYSICIAN QUERY
PT Name: Harry Barrientos Jr.  MR #: [id number]     Physician Query Form - Documentation Clarification      CDS/: Marcello Reyes RN              Contact information:   [email or web address]    This form is a permanent document in the medical record.     Query Date: August 28, 2017    By submitting this query, we are merely seeking further clarification of documentation. Please utilize your independent clinical judgment when addressing the question(s) below.    The Medical record reflects the following:    Supporting Clinical Findings Location in Medical Record     Short of breath secondary to combination of asthma and possible acute bronchitis...Mild intermittent reactive airway disease with status asthmaticus ...Acute bronchitis....Respiratory distress     H&P  8/27       /114; ; RR: 22;  T 99.1 °F (37.3 °C);  O2Sats:   88 % RA    He presents to the ED in some respiratory distress. He was given steroids and breathing treatments    O2sat: 100% on Venimask at 8L. RR: 20     O2sat: 98% on Venimask at 2L;   RR: 20   Ed prov 8/27@0322      H&P  8/27        Flow sheet 8/27@0301    Flow sheet 8/27@1101                                                                            Doctor, Please specify diagnosis or diagnoses associated with above clinical findings.  To ensure accurately reporting, please further specify the diagnosis of asthma:    Provider Use Only    [  ]  Severe persistent asthma with status asthmaticus    [ x ]  Mild intermittent asthma with acute exacerbation    [  ] other asthma/ specify:  _________________________________________                                                                                                           [  ] Clinically undetermined

## 2017-08-28 NOTE — ASSESSMENT & PLAN NOTE
Secondary to combination of asthma and possible acute bronchitis. After treatment with steroids/breathing treatments- patient much improved.- now all symptoms resolved and requesting discharge. No 02.

## 2017-08-28 NOTE — PHYSICIAN QUERY
PT Name: Harry Barrientos Jr.  MR #: [id number]      Computer Glitch--this document was lost, was not saved, and appeared here this morning.  dv    Physician Query Form - Respiratory Condition Clarification      CDS/: Marcello Reyes              Contact information:     This form is a permanent document in the medical record.    Query Date: August 28, 2017    By submitting this query, we are merely seeking further clarification of documentation. Please utilize your independent clinical judgment when addressing the question(s) below.    The Medical record contains the following   Indicators   Supporting Clinical Findings Location in Medical Record   x SOB, GILES, Wheezing, Productive Cough, Use of Accessory Muscles, etc. Shortness of Breath pt reports feeling SOB over the past month but worsening tonight; pt has hx of asthma and took about 10 albuterol treatments today at home with no relief; pt 88% on room air     Respiratory: Positive for cough, shortness of breath and wheezing.   ED prov  8/27                  H&P 8/27   x   Acute/Chronic Illness Mild intermittent reactive airway disease with status asthmaticus     x   Radiology Findings     x   Respiratory Distress or Failure        Hypoxia or Hypercapnia        RR         ABGs         O2 sat        BiPAP/Intubation        Supplemental O2        Home O2, Oxygen Dependence        Treatment     x   Other BP: 145/114;  HR: 112;    RR: 22;  SpO2: 88%  T: 99.1 ED prov 8/27     Provider, please specify diagnosis or diagnoses associated with above clinical findings.    [  ] Acute Respiratory Failure with Hypoxia  [  ] Acute Respiratory Failure with Hypercapnia  [  ] Acute Respiratory Failure with Hypoxia and Hypercapnia  [  ] Other Acute Respiratory Failure  [  ] Acute and (on) Chronic Respiratory Failure with Hypoxia  [  ] Acute and (on) Chronic Respiratory Failure with Hypercapnia  [  ] Acute and (on) Chronic Respiratory Failure with Hypoxia and Hypercapnia  [  ] Other  Acute and (on) Chronic Respiratory Failure  [  ] Chronic Respiratory Failure with Hypoxia  [  ] Chronic Respiratory Failure with Hypercapnia  [  ] Chronic Respiratory Failure with Hypoxia and Hypercapnia  [  ] Other Chronic Respiratory Failure  [  ] Other Respiratory Diagnosis (please specify): _______________________________  [  ] Clinically Undetermined    Please document in your progress notes daily for the duration of treatment until resolved and include in your discharge summary.

## 2017-08-28 NOTE — SUBJECTIVE & OBJECTIVE
Interval History: would like to go home.       Review of Systems   Constitutional: Negative for activity change.   Respiratory: Negative for chest tightness, shortness of breath and wheezing.    Cardiovascular: Negative for chest pain.   Gastrointestinal: Negative for abdominal pain.     Objective:     Vital Signs (Most Recent):  Temp: 97.6 °F (36.4 °C) (08/28/17 0400)  Pulse: 86 (08/28/17 0734)  Resp: (!) 21 (08/28/17 0734)  BP: 118/76 (08/28/17 0400)  SpO2: (!) 92 % (08/28/17 0734) Vital Signs (24h Range):  Temp:  [97.6 °F (36.4 °C)-98.1 °F (36.7 °C)] 97.6 °F (36.4 °C)  Pulse:  [] 86  Resp:  [17-22] 21  SpO2:  [92 %-100 %] 92 %  BP: (118-158)/(75-90) 118/76     Weight: 65.5 kg (144 lb 5 oz)  Body mass index is 21.94 kg/m².    Intake/Output Summary (Last 24 hours) at 08/28/17 0828  Last data filed at 08/28/17 0418   Gross per 24 hour   Intake             1220 ml   Output              575 ml   Net              645 ml      Physical Exam   Constitutional: He is oriented to person, place, and time. He appears well-developed and well-nourished.   HENT:   Head: Normocephalic and atraumatic.   Cardiovascular: Normal rate.    Pulmonary/Chest: Effort normal and breath sounds normal. No respiratory distress. He has no wheezes. He has no rales.   Neurological: He is alert and oriented to person, place, and time.       Significant Labs:   BMP:   Recent Labs  Lab 08/27/17 0428         K 4.4      CO2 25   BUN 14   CREATININE 1.1   CALCIUM 9.3     CBC:   Recent Labs  Lab 08/27/17 0428   WBC 15.96*   HGB 14.0   HCT 50.9          Significant Imaging:

## 2017-08-28 NOTE — PROGRESS NOTES
[facility] Hospital Medicine  Progress Note    Patient Name: Harry Barrientos Jr.  MRN: [id number]  Patient Class: IP- Inpatient   Admission Date: 8/27/2017  Length of Stay: 1 days  Attending Physician: Fred Redmond MD  Primary Care Provider: Primary Doctor No        Subjective:     Principal Problem:Severe persistent asthma with acute exacerbation    HPI:  Mr. Barrientos is a 42 yo M with a history of asthma. He presents with a one day history of SOB and a week history of productive cough. He notes that one of his children has the same illness.  He uses his son's nebulizer PRN. He presents to the ED in some respiratory distress. He was given steroids and breathing treatments. He is now in his room and feels much better and was walt asking to go home later. He notes his cough is productive of greenish sputum. No sepsis. CXR was negative and the patient is non-ill appearing.      Hospital Course:  The patient was admitted for evaluation and treatment of acute respiratory distress from asthma/acute bronchitis. He was started on steroids, breathing treatments as well as doxy.  He clinically improved rapidly. The following day, the patient was resting on RA. No wheezing on exam. He was requesting discharge to home and stated all symptoms had resolved. He will be sent home on a steroid taper with Doxy and inhaler.      Interval History: would like to go home.       Review of Systems   Constitutional: Negative for activity change.   Respiratory: Negative for chest tightness, shortness of breath and wheezing.    Cardiovascular: Negative for chest pain.   Gastrointestinal: Negative for abdominal pain.     Objective:     Vital Signs (Most Recent):  Temp: 97.6 °F (36.4 °C) (08/28/17 0400)  Pulse: 86 (08/28/17 0734)  Resp: (!) 21 (08/28/17 0734)  BP: 118/76 (08/28/17 0400)  SpO2: (!) 92 % (08/28/17 0734) Vital Signs (24h Range):  Temp:  [97.6 °F (36.4 °C)-98.1 °F (36.7 °C)] 97.6 °F (36.4 °C)  Pulse:  []  86  Resp:  [17-22] 21  SpO2:  [92 %-100 %] 92 %  BP: (118-158)/(75-90) 118/76     Weight: 65.5 kg (144 lb 5 oz)  Body mass index is 21.94 kg/m².    Intake/Output Summary (Last 24 hours) at 08/28/17 0828  Last data filed at 08/28/17 0418   Gross per 24 hour   Intake             1220 ml   Output              575 ml   Net              645 ml      Physical Exam   Constitutional: He is oriented to person, place, and time. He appears well-developed and well-nourished.   HENT:   Head: Normocephalic and atraumatic.   Cardiovascular: Normal rate.    Pulmonary/Chest: Effort normal and breath sounds normal. No respiratory distress. He has no wheezes. He has no rales.   Neurological: He is alert and oriented to person, place, and time.       Significant Labs:   BMP:   Recent Labs  Lab 08/27/17 0428         K 4.4      CO2 25   BUN 14   CREATININE 1.1   CALCIUM 9.3     CBC:   Recent Labs  Lab 08/27/17 0428   WBC 15.96*   HGB 14.0   HCT 50.9          Significant Imaging:     Assessment/Plan:      Shortness of breath    Secondary to combination of asthma and possible acute bronchitis. After treatment with steroids/breathing treatments- patient much improved.- now all symptoms resolved and requesting discharge. No 02.           Respiratory distress    Resolved. From #1.          Mild intermittent reactive airway disease with status asthmaticus    As discussed.  Clinically improved.           Acute bronchitis    Continue breathing treatments and steroids today   Sepsis not present.  No fevers.  Doxy added.         HTN (hypertension), benign    Not on any meds. Will follow for now. May need med on discharge          Tachycardia    Should improve with correction of hypoxia.- resolved.             VTE Risk Mitigation         Ordered     enoxaparin injection 40 mg  Daily     Route:  Subcutaneous        08/27/17 0803     Medium Risk of VTE  Once      08/27/17 0803        Will d/c to home.         Fred JONES  MD Magali  Department of Hospital Medicine   [facility]

## 2017-08-28 NOTE — PLAN OF CARE
08/28/17 1304   Final Note   Assessment Type Final Discharge Note   Hospital Follow Up  Appt(s) scheduled? No   Right Care Referral Info   Post Acute Recommendation No Care   Patient discharged this date prior to SW contact.  Maddie Hitchcock LMSW, JW-RAFY, [facility]  08/28/2017

## 2017-08-28 NOTE — DISCHARGE SUMMARY
[facility] Hospital Medicine  Discharge Summary      Patient Name: Harry Barrientos Jr.  MRN: [id number]  Admission Date: 8/27/2017  Hospital Length of Stay: 1 days  Discharge Date and Time:  08/28/2017 8:33 AM  Attending Physician: Fred Redmond MD   Discharging Provider: Fred Redmond MD  Primary Care Provider: Primary Doctor No      HPI:   Mr. Barrientos is a 40 yo M with a history of asthma. He presents with a one day history of SOB and a week history of productive cough. He notes that one of his children has the same illness.  He uses his son's nebulizer PRN. He presents to the ED in some respiratory distress. He was given steroids and breathing treatments. He is now in his room and feels much better and was walt asking to go home later. He notes his cough is productive of greenish sputum. No sepsis. CXR was negative and the patient is non-ill appearing.      * No surgery found *      Indwelling Lines/Drains at time of discharge:   Lines/Drains/Airways          No matching active lines, drains, or airways        Hospital Course:   The patient was admitted for evaluation and treatment of acute respiratory distress from asthma/acute bronchitis. He was started on steroids, breathing treatments as well as doxy.  He clinically improved rapidly. The following day, the patient was resting on RA. No wheezing on exam. He was requesting discharge to home and stated all symptoms had resolved. He will be sent home on a steroid taper with Doxy and inhaler.  Low NA diet. Activity as tolerated. Follow up with PCP in one week      Consults:     Significant Diagnostic Studies:    Pending Diagnostic Studies:     None        Final Active Diagnoses:    Diagnosis Date Noted POA    HTN (hypertension), benign [I10] 08/27/2017 Yes      Problems Resolved During this Admission:    Diagnosis Date Noted Date Resolved POA    PRINCIPAL PROBLEM:  Severe persistent asthma with acute exacerbation [J45.51] 08/27/2017  08/27/2017 Yes    Shortness of breath [R06.02] 10/12/2015 08/28/2017 Yes    Respiratory distress [R06.00] 10/12/2015 08/28/2017 Yes    Mild intermittent reactive airway disease with status asthmaticus [J45.22] 10/12/2015 08/28/2017 Yes    Acute bronchitis [J20.9] 08/27/2017 08/28/2017 Yes    Tachycardia [R00.0] 10/12/2015 08/28/2017 Yes      No new Assessment & Plan notes have been filed under this hospital service since the last note was generated.  Service: Hospital Medicine      Discharged Condition: good    Disposition: Home or Self Care    Follow Up:  Follow-up Information     Primary Doctor No In 1 week.               Patient Instructions:     Diet general   Order Specific Question Answer Comments   Na restriction, if any: 2gNa      Activity as tolerated       Medications:  Reconciled Home Medications:   Current Discharge Medication List      START taking these medications    Details   albuterol 90 mcg/actuation inhaler Inhale 2 puffs into the lungs every 6 (six) hours as needed for Wheezing. Rescue  Qty: 18 g, Refills: 5      doxycycline (VIBRA-TABS) 100 MG tablet Take 1 tablet (100 mg total) by mouth every 12 (twelve) hours.  Qty: 14 tablet, Refills: 0      predniSONE (DELTASONE) 20 MG tablet 3 tablets by mouth daily for 3 days  2 tablets by mouth daily for 3 days  1 tablet by mouth daily for 3 days  Qty: 18 tablet, Refills: 0         CONTINUE these medications which have NOT CHANGED    Details   albuterol (PROVENTIL) 2.5 mg /3 mL (0.083 %) nebulizer solution Take 3 mLs (2.5 mg total) by nebulization every 6 (six) hours as needed for Wheezing. Rescue  Qty: 1 Box, Refills: 2      albuterol-ipratropium 2.5mg-0.5mg/3mL (DUO-NEB) 0.5 mg-3 mg(2.5 mg base)/3 mL nebulizer solution Take 3 mLs by nebulization every 6 (six) hours while awake.  Qty: 10 vial, Refills: 0           Time spent on the discharge of patient: * < 30 minutes    HOS POC IP DISCHARGE SUMMARY    Fred De Los Santos MD  Department of Hospital  Medicine  [facility]

## 2017-08-28 NOTE — PHYSICIAN QUERY
"PT Name: Harry Barrientos Jr.  MR #: [id number]    Physician Query Form - Respiratory Condition Clarification      CDS/: Marcello Reyes RN             Contact information:    [email or web address]    This form is a permanent document in the medical record.    Query Date: August 28, 2017    By submitting this query, we are merely seeking further clarification of documentation. Please utilize your independent clinical judgment when addressing the question(s) below.    The Medical record contains the following   Indicators   Supporting Clinical Findings Location in Medical Record   x   SOB, GILES, Wheezing, Productive Cough, Use of Accessory Muscles, etc. Shortness of Breath pt reports feeling SOB over the past month but worsening tonight; pt has hx of asthma and took about 10 albuterol treatments today at home with no relief; pt 88% on room air    ED prov 8/27   x   Acute/Chronic Illness Mild intermittent reactive airway disease with status asthmaticus...Acute bronchitis...Hypertension    H&P  8/27   x   Radiology Findings Mildly increased perihilar and peribronchial interstitial markings which could represent viral respiratory illness or reactive airway disease.     XR chest 8/27   x   Respiratory Distress or Failure "Respiratory distress " H&P 8/27   x   Hypoxia or Hypercapnia Tachycardia Should improve with correction of hypoxia.       H&P 8/27   x   RR         ABGs         O2 sat O2sat: 100% on Venimask at 8L. RR: 20    O2sat: 98% on Venimask at 2L;   RR: 20     Flow sheet 8/27@0301      Flow sheet 8/27@1101      BiPAP/Intubation        Supplemental O2        Home O2, Oxygen Dependence     x   Treatment He presents to the ED in some respiratory distress. He was given steroids and breathing treatments   H&P  8/27      Other       Provider, please specify diagnosis or diagnoses associated with above clinical findings.    [  ] Acute Respiratory Failure with Hypoxia  [  ] Acute Respiratory Failure with " Hypercapnia  [  ] Acute Respiratory Failure with Hypoxia and Hypercapnia  [  ] Other Acute Respiratory Failure  [  x] Other Respiratory Diagnosis (please specify): ____No failure __________________________  [  ] Clinically Undetermined    Please document in your progress notes daily for the duration of treatment until resolved and include in your discharge summary.

## 2017-08-30 ENCOUNTER — PATIENT OUTREACH (OUTPATIENT)
Dept: ADMINISTRATIVE | Facility: CLINIC | Age: 42
End: 2017-08-30

## 2017-08-30 NOTE — PATIENT INSTRUCTIONS
Discharge Instructions for Asthma  You have been diagnosed with asthma. With the help of your healthcare provider, you can keep your asthma under control and have less emergency department visits and hospitalizations.    Managing asthma  · Take your asthma medications exactly as your provider tells you.  · Learn how to monitor your asthma. Some people watch for early changes of worsening symptoms and some use a peak flow meter.  · Be sure to always have a quick-relief inhaler with you. If you were given a prescription, make sure you go to the drug store or pharmacy to get it filled as soon as possible.  Controlling asthma triggers  Triggers are those things that make your asthma symptoms worse or cause asthma attacks.  · Dust or dust mites are a common asthma trigger. To avoid a dust mites, do the following:  ? Use dust-proof covers on your mattress and pillows. Wash the sheets and blankets on your bed once a week in very hot water.  ? Dont sleep or lie on cloth-covered cushions or furniture.  ? Ask someone else to vacuum and dust your house.  ? If you do vacuum and dust yourself, wear a dust mask (from the hardware store).  ? Use a vacuum with a double-layered bag or HEPA (high-efficiency particulate air) filter.  · Pets with fur or feathers are triggers for some people. If you must have pets, take these precautions:  ? Keep pets out of your bedroom and off your bed. Keep the bedroom door closed.  ? Cover the air vents in your bedroom with heavy material to filter the air.  ? Avoid carpets and cloth-covered furniture in your home. If this is not possible, keep pets out of rooms with these items.  ? Have someone bathe your pets every week. And, brush them often.  · If you smoke, do your best to quit.  ? Enroll in a stop-smoking program to increase your chance of success.  ? Ask your health care provider about medications or other methods to help you quit.  ? Ask family members to quit smoking as well.  ? Dont  allow anyone to smoke in your home, in your car, or around you.  · Make sure you know what to do if exercise is a trigger for you. Many people use quick-relief inhalers before exercise or physical activity.  · Get a flu shot every year and get pneumonia shots as advised by your health care provider.  · Try to keep your windows closed during pollen, mold, and allergy seasons.  · On cold or windy days, cover your nose and mouth with a scarf.  · Try to stay away from people who are sick with colds or the flu. Wash your hands often. If respiratory infections, like colds or flu, trigger your asthma, use your quick-relief medications as soon as you begin to notice respiratory symptoms. They may include a runny or stuffy nose, sore throat, or a cough.  Follow-up care  Make a follow-up appointment as directed by our staff.  When to seek medical attention  Call 911 right away if you have:  · Severe wheezing  · Shortness of breath that is not relieved by your quick-relief medication  · Trouble walking or talking because of shortness of breath  · Blue lips or fingernails  · If you monitor symptoms with a peak flow meter, readings less than 50% of your personal best   Date Last Reviewed: 8/18/2014  © 2000-2017[employer] [address] [place] [address], [place] [address]. All rights reserved. This information is not intended as a substitute for professional medical care. Always follow your healthcare professional's instructions.

## 2017-09-19 ENCOUNTER — NURSE TRIAGE (OUTPATIENT)
Dept: ADMINISTRATIVE | Facility: CLINIC | Age: 42
End: 2017-09-19

## 2017-09-19 NOTE — TELEPHONE ENCOUNTER
"  Reason for Disposition   Caller has NON-URGENT medication question about med that PCP prescribed and triager unable to answer question   MODERATE asthma attack (e.g., SOB at rest, speaks in phrases, audible wheezes) and not resolved after 2 nebulizer or inhaler treatments given 20 minutes apart    Answer Assessment - Initial Assessment Questions  1. SYMPTOMS: "Do you have any symptoms?"      Sob, congestion  2. SEVERITY: If symptoms are present, ask "Are they mild, moderate or severe?"      -    Patient is requesting a refill on asthma medication.    Protocols used: ST MEDICATION QUESTION CALL-A-AH,  ASTHMA ATTACK-A-OH    "

## 2018-06-27 ENCOUNTER — HOSPITAL ENCOUNTER (EMERGENCY)
Facility: HOSPITAL | Age: 43
Discharge: HOME | End: 2018-06-27
Attending: EMERGENCY MEDICINE
Payer: MEDICAID

## 2018-06-27 VITALS
SYSTOLIC BLOOD PRESSURE: 176 MMHG | HEIGHT: 68 IN | TEMPERATURE: 99 F | BODY MASS INDEX: 23.64 KG/M2 | OXYGEN SATURATION: 92 % | RESPIRATION RATE: 20 BRPM | DIASTOLIC BLOOD PRESSURE: 78 MMHG | WEIGHT: 156 LBS | HEART RATE: 111 BPM

## 2018-06-27 DIAGNOSIS — R06.02 SHORTNESS OF BREATH: ICD-10-CM

## 2018-06-27 DIAGNOSIS — J20.9 COMPLICATED ACUTE BRONCHITIS: Primary | ICD-10-CM

## 2018-06-27 DIAGNOSIS — J45.901 EXACERBATION OF ASTHMA, UNSPECIFIED ASTHMA SEVERITY, UNSPECIFIED WHETHER PERSISTENT: ICD-10-CM

## 2018-06-27 LAB
ALBUMIN SERPL BCP-MCNC: 4.4 G/DL
ALP SERPL-CCNC: 72 U/L
ALT SERPL W/O P-5'-P-CCNC: 23 U/L
ANION GAP SERPL CALC-SCNC: 9 MMOL/L
AST SERPL-CCNC: 20 U/L
BASOPHILS # BLD AUTO: ABNORMAL K/UL
BASOPHILS NFR BLD: 0 %
BILIRUB SERPL-MCNC: 0.5 MG/DL
BNP SERPL-MCNC: 18 PG/ML
BUN SERPL-MCNC: 12 MG/DL
CALCIUM SERPL-MCNC: 10 MG/DL
CHLORIDE SERPL-SCNC: 103 MMOL/L
CO2 SERPL-SCNC: 27 MMOL/L
CREAT SERPL-MCNC: 1.1 MG/DL
DIFFERENTIAL METHOD: ABNORMAL
EOSINOPHIL # BLD AUTO: ABNORMAL K/UL
EOSINOPHIL NFR BLD: 21 %
ERYTHROCYTE [DISTWIDTH] IN BLOOD BY AUTOMATED COUNT: 15 %
EST. GFR  (AFRICAN AMERICAN): >60 ML/MIN/1.73 M^2
EST. GFR  (NON AFRICAN AMERICAN): >60 ML/MIN/1.73 M^2
GLUCOSE SERPL-MCNC: 98 MG/DL
HCT VFR BLD AUTO: 42.2 %
HGB BLD-MCNC: 13.5 G/DL
LYMPHOCYTES # BLD AUTO: ABNORMAL K/UL
LYMPHOCYTES NFR BLD: 9 %
MCH RBC QN AUTO: 24.2 PG
MCHC RBC AUTO-ENTMCNC: 32 G/DL
MCV RBC AUTO: 76 FL
MONOCYTES # BLD AUTO: ABNORMAL K/UL
MONOCYTES NFR BLD: 8 %
NEUTROPHILS NFR BLD: 62 %
PLATELET # BLD AUTO: 334 K/UL
PMV BLD AUTO: 11.7 FL
POTASSIUM SERPL-SCNC: 4.9 MMOL/L
PROT SERPL-MCNC: 8.2 G/DL
RBC # BLD AUTO: 5.57 M/UL
SODIUM SERPL-SCNC: 139 MMOL/L
TROPONIN I SERPL DL<=0.01 NG/ML-MCNC: <0.006 NG/ML
WBC # BLD AUTO: 8.65 K/UL

## 2018-06-27 PROCEDURE — 27000221 HC OXYGEN, UP TO 24 HOURS

## 2018-06-27 PROCEDURE — 84484 ASSAY OF TROPONIN QUANT: CPT

## 2018-06-27 PROCEDURE — 96374 THER/PROPH/DIAG INJ IV PUSH: CPT

## 2018-06-27 PROCEDURE — 25000242 PHARM REV CODE 250 ALT 637 W/ HCPCS: Performed by: NURSE PRACTITIONER

## 2018-06-27 PROCEDURE — 93010 ELECTROCARDIOGRAM REPORT: CPT | Mod: 76,,, | Performed by: INTERNAL MEDICINE

## 2018-06-27 PROCEDURE — 63600175 PHARM REV CODE 636 W HCPCS: Performed by: EMERGENCY MEDICINE

## 2018-06-27 PROCEDURE — 85025 COMPLETE CBC W/AUTO DIFF WBC: CPT

## 2018-06-27 PROCEDURE — 25000242 PHARM REV CODE 250 ALT 637 W/ HCPCS: Performed by: EMERGENCY MEDICINE

## 2018-06-27 PROCEDURE — 99284 EMERGENCY DEPT VISIT MOD MDM: CPT | Mod: 25

## 2018-06-27 PROCEDURE — 83880 ASSAY OF NATRIURETIC PEPTIDE: CPT

## 2018-06-27 PROCEDURE — 80053 COMPREHEN METABOLIC PANEL: CPT

## 2018-06-27 PROCEDURE — 93005 ELECTROCARDIOGRAM TRACING: CPT

## 2018-06-27 PROCEDURE — 94644 CONT INHLJ TX 1ST HOUR: CPT

## 2018-06-27 PROCEDURE — 63600175 PHARM REV CODE 636 W HCPCS: Performed by: NURSE PRACTITIONER

## 2018-06-27 PROCEDURE — 94640 AIRWAY INHALATION TREATMENT: CPT

## 2018-06-27 PROCEDURE — 94761 N-INVAS EAR/PLS OXIMETRY MLT: CPT

## 2018-06-27 PROCEDURE — 25000003 PHARM REV CODE 250: Performed by: EMERGENCY MEDICINE

## 2018-06-27 RX ORDER — ALBUTEROL SULFATE 2.5 MG/.5ML
10 SOLUTION RESPIRATORY (INHALATION)
Status: COMPLETED | OUTPATIENT
Start: 2018-06-27 | End: 2018-06-27

## 2018-06-27 RX ORDER — LEVALBUTEROL INHALATION SOLUTION 1.25 MG/3ML
1 SOLUTION RESPIRATORY (INHALATION) EVERY 4 HOURS PRN
Qty: 75 ML | Refills: 0 | Status: SHIPPED | OUTPATIENT
Start: 2018-06-27 | End: 2019-06-27

## 2018-06-27 RX ORDER — IPRATROPIUM BROMIDE 0.5 MG/2.5ML
500 SOLUTION RESPIRATORY (INHALATION)
Status: COMPLETED | OUTPATIENT
Start: 2018-06-27 | End: 2018-06-27

## 2018-06-27 RX ORDER — DOXYCYCLINE 100 MG/1
100 CAPSULE ORAL 2 TIMES DAILY
Qty: 20 CAPSULE | Refills: 0 | Status: SHIPPED | OUTPATIENT
Start: 2018-06-27 | End: 2018-07-07

## 2018-06-27 RX ORDER — LEVALBUTEROL 1.25 MG/.5ML
1.25 SOLUTION, CONCENTRATE RESPIRATORY (INHALATION)
Status: COMPLETED | OUTPATIENT
Start: 2018-06-27 | End: 2018-06-27

## 2018-06-27 RX ORDER — DOXYCYCLINE HYCLATE 100 MG
100 TABLET ORAL
Status: COMPLETED | OUTPATIENT
Start: 2018-06-27 | End: 2018-06-27

## 2018-06-27 RX ORDER — HYDROCODONE BITARTRATE AND ACETAMINOPHEN 5; 325 MG/1; MG/1
2 TABLET ORAL
Status: COMPLETED | OUTPATIENT
Start: 2018-06-27 | End: 2018-06-27

## 2018-06-27 RX ORDER — PREDNISONE 10 MG/1
TABLET ORAL
Qty: 36 TABLET | Refills: 0 | Status: SHIPPED | OUTPATIENT
Start: 2018-06-27

## 2018-06-27 RX ORDER — METHYLPREDNISOLONE SOD SUCC 125 MG
125 VIAL (EA) INJECTION
Status: COMPLETED | OUTPATIENT
Start: 2018-06-27 | End: 2018-06-27

## 2018-06-27 RX ORDER — PREDNISONE 20 MG/1
60 TABLET ORAL
Status: COMPLETED | OUTPATIENT
Start: 2018-06-27 | End: 2018-06-27

## 2018-06-27 RX ADMIN — ALBUTEROL SULFATE 10 MG: 2.5 SOLUTION RESPIRATORY (INHALATION) at 12:06

## 2018-06-27 RX ADMIN — HYDROCODONE BITARTRATE AND ACETAMINOPHEN 2 TABLET: 5; 325 TABLET ORAL at 01:06

## 2018-06-27 RX ADMIN — LEVALBUTEROL 1.25 MG: 1.25 SOLUTION, CONCENTRATE RESPIRATORY (INHALATION) at 02:06

## 2018-06-27 RX ADMIN — IPRATROPIUM BROMIDE 500 MCG: 0.5 SOLUTION RESPIRATORY (INHALATION) at 12:06

## 2018-06-27 RX ADMIN — PREDNISONE 60 MG: 20 TABLET ORAL at 03:06

## 2018-06-27 RX ADMIN — DOXYCYCLINE HYCLATE 100 MG: 100 TABLET, COATED ORAL at 03:06

## 2018-06-27 RX ADMIN — METHYLPREDNISOLONE SODIUM SUCCINATE 125 MG: 125 INJECTION, POWDER, FOR SOLUTION INTRAMUSCULAR; INTRAVENOUS at 01:06
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
## 2018-06-27 NOTE — ED TRIAGE NOTES
Pt arrived to ED from home with c/o sob x2-3 days ago. Opt states he has a hx of asthma and no other significant history. Pt has elevated bp on arrival of 173/106 pt states that he used a breathing trx at home with no relief. Pt malert and oriented x4

## 2025-03-17 ENCOUNTER — HOSPITAL ENCOUNTER (EMERGENCY)
Facility: HOSPITAL | Age: 50
Discharge: LEFT BEFORE DISCHARGE | End: 2025-03-17
Payer: MEDICARE

## 2025-03-17 VITALS
HEIGHT: 69 IN | WEIGHT: 172 LBS | TEMPERATURE: 99 F | OXYGEN SATURATION: 96 % | SYSTOLIC BLOOD PRESSURE: 143 MMHG | DIASTOLIC BLOOD PRESSURE: 90 MMHG | BODY MASS INDEX: 25.48 KG/M2 | HEART RATE: 102 BPM | RESPIRATION RATE: 18 BRPM
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
## 2025-03-17 PROCEDURE — 99900041 HC LEFT WITHOUT BEING SEEN- EMERGENCY
